# Patient Record
Sex: MALE | Race: WHITE | NOT HISPANIC OR LATINO | Employment: FULL TIME | URBAN - NONMETROPOLITAN AREA
[De-identification: names, ages, dates, MRNs, and addresses within clinical notes are randomized per-mention and may not be internally consistent; named-entity substitution may affect disease eponyms.]

---

## 2017-01-19 ENCOUNTER — HOSPITAL ENCOUNTER (EMERGENCY)
Facility: HOSPITAL | Age: 57
Discharge: HOME | End: 2017-01-19
Attending: EMERGENCY MEDICINE | Admitting: EMERGENCY MEDICINE

## 2017-01-19 ENCOUNTER — APPOINTMENT (OUTPATIENT)
Dept: CT IMAGING | Facility: HOSPITAL | Age: 57
End: 2017-01-19

## 2017-01-19 ENCOUNTER — APPOINTMENT (OUTPATIENT)
Dept: MRI IMAGING | Facility: HOSPITAL | Age: 57
End: 2017-01-19

## 2017-01-19 ENCOUNTER — TRANSCRIBE ORDERS (OUTPATIENT)
Dept: ADMINISTRATIVE | Facility: HOSPITAL | Age: 57
End: 2017-01-19

## 2017-01-19 ENCOUNTER — APPOINTMENT (OUTPATIENT)
Dept: GENERAL RADIOLOGY | Facility: HOSPITAL | Age: 57
End: 2017-01-19

## 2017-01-19 ENCOUNTER — APPOINTMENT (OUTPATIENT)
Dept: ULTRASOUND IMAGING | Facility: HOSPITAL | Age: 57
End: 2017-01-19

## 2017-01-19 VITALS
HEIGHT: 72 IN | RESPIRATION RATE: 16 BRPM | BODY MASS INDEX: 34.54 KG/M2 | HEART RATE: 83 BPM | SYSTOLIC BLOOD PRESSURE: 167 MMHG | TEMPERATURE: 98.3 F | WEIGHT: 255 LBS | DIASTOLIC BLOOD PRESSURE: 76 MMHG | OXYGEN SATURATION: 96 %

## 2017-01-19 DIAGNOSIS — D75.839 THROMBOCYTOSIS: Primary | ICD-10-CM

## 2017-01-19 DIAGNOSIS — R20.0 NUMBNESS: ICD-10-CM

## 2017-01-19 DIAGNOSIS — E23.7 PITUITARY ABNORMALITY (HCC): Primary | ICD-10-CM

## 2017-01-19 DIAGNOSIS — E23.7 PITUITARY ABNORMALITY (HCC): ICD-10-CM

## 2017-01-19 LAB
ALBUMIN SERPL-MCNC: 4.5 G/DL (ref 3.5–5)
ALBUMIN/GLOB SERPL: 1.6 G/DL (ref 1.1–2.5)
ALP SERPL-CCNC: 93 U/L (ref 24–120)
ALT SERPL W P-5'-P-CCNC: 71 U/L (ref 0–54)
AMYLASE SERPL-CCNC: 60 U/L (ref 30–110)
ANION GAP SERPL CALCULATED.3IONS-SCNC: 14 MMOL/L (ref 4–13)
APTT PPP: 28.5 SECONDS (ref 24.1–34.8)
AST SERPL-CCNC: 66 U/L (ref 7–45)
BILIRUB SERPL-MCNC: 0.9 MG/DL (ref 0.1–1)
BILIRUB UR QL STRIP: NEGATIVE
BUN BLD-MCNC: 13 MG/DL (ref 5–21)
BUN/CREAT SERPL: 13.7 (ref 7–25)
CALCIUM SPEC-SCNC: 9.2 MG/DL (ref 8.4–10.4)
CHLORIDE SERPL-SCNC: 101 MMOL/L (ref 98–110)
CLARITY UR: CLEAR
CO2 SERPL-SCNC: 24 MMOL/L (ref 24–31)
COLOR UR: YELLOW
CREAT BLD-MCNC: 0.95 MG/DL (ref 0.5–1.4)
DEPRECATED RDW RBC AUTO: 47.4 FL (ref 40–54)
EOSINOPHIL # BLD MANUAL: 0.1 10*3/MM3 (ref 0–0.7)
EOSINOPHIL NFR BLD MANUAL: 1 % (ref 0–4)
ERYTHROCYTE [DISTWIDTH] IN BLOOD BY AUTOMATED COUNT: 15.2 % (ref 12–15)
GFR SERPL CREATININE-BSD FRML MDRD: 83 ML/MIN/1.73
GLOBULIN UR ELPH-MCNC: 2.9 GM/DL
GLUCOSE BLD-MCNC: 127 MG/DL (ref 70–100)
GLUCOSE UR STRIP-MCNC: NEGATIVE MG/DL
HCT VFR BLD AUTO: 39.2 % (ref 40–52)
HGB BLD-MCNC: 13.7 G/DL (ref 14–18)
HGB UR QL STRIP.AUTO: NEGATIVE
HOLD SPECIMEN: NORMAL
HOLD SPECIMEN: NORMAL
INR PPP: 0.93 (ref 0.91–1.09)
KETONES UR QL STRIP: NEGATIVE
LEUKOCYTE ESTERASE UR QL STRIP.AUTO: NEGATIVE
LIPASE SERPL-CCNC: 206 U/L (ref 23–203)
LYMPHOCYTES # BLD MANUAL: 2.09 10*3/MM3 (ref 0.72–4.86)
LYMPHOCYTES NFR BLD MANUAL: 22 % (ref 15–45)
LYMPHOCYTES NFR BLD MANUAL: 3 % (ref 4–12)
MCH RBC QN AUTO: 30.4 PG (ref 28–32)
MCHC RBC AUTO-ENTMCNC: 34.9 G/DL (ref 33–36)
MCV RBC AUTO: 86.9 FL (ref 82–95)
METAMYELOCYTES NFR BLD MANUAL: 1 % (ref 0–0)
MONOCYTES # BLD AUTO: 0.29 10*3/MM3 (ref 0.19–1.3)
NEUTROPHILS # BLD AUTO: 6.75 10*3/MM3 (ref 1.87–8.4)
NEUTROPHILS NFR BLD MANUAL: 69 % (ref 39–78)
NEUTS BAND NFR BLD MANUAL: 2 % (ref 0–10)
NITRITE UR QL STRIP: NEGATIVE
NT-PROBNP SERPL-MCNC: 77 PG/ML (ref 0–900)
PH UR STRIP.AUTO: 6.5 [PH] (ref 5–8)
PLATELET # BLD AUTO: 998 10*3/MM3 (ref 130–400)
PMV BLD AUTO: 9.5 FL (ref 6–12)
POTASSIUM BLD-SCNC: 4 MMOL/L (ref 3.5–5.3)
PROT SERPL-MCNC: 7.4 G/DL (ref 6.3–8.7)
PROT UR QL STRIP: NEGATIVE
PROTHROMBIN TIME: 12.7 SECONDS (ref 11.9–14.6)
RBC # BLD AUTO: 4.51 10*6/MM3 (ref 4.8–5.9)
RBC MORPH BLD: NORMAL
SMALL PLATELETS BLD QL SMEAR: ABNORMAL
SODIUM BLD-SCNC: 139 MMOL/L (ref 135–145)
SP GR UR STRIP: 1.01 (ref 1–1.03)
TROPONIN I SERPL-MCNC: 0 NG/ML (ref 0–0.07)
UROBILINOGEN UR QL STRIP: NORMAL
VARIANT LYMPHS NFR BLD MANUAL: 2 % (ref 0–5)
WBC MORPH BLD: NORMAL
WBC NRBC COR # BLD: 9.5 10*3/MM3 (ref 4.8–10.8)
WHOLE BLOOD HOLD SPECIMEN: NORMAL
WHOLE BLOOD HOLD SPECIMEN: NORMAL

## 2017-01-19 PROCEDURE — 71010 HC CHEST PA OR AP: CPT

## 2017-01-19 PROCEDURE — 83880 ASSAY OF NATRIURETIC PEPTIDE: CPT | Performed by: EMERGENCY MEDICINE

## 2017-01-19 PROCEDURE — 70551 MRI BRAIN STEM W/O DYE: CPT

## 2017-01-19 PROCEDURE — 84484 ASSAY OF TROPONIN QUANT: CPT

## 2017-01-19 PROCEDURE — 85025 COMPLETE CBC W/AUTO DIFF WBC: CPT | Performed by: EMERGENCY MEDICINE

## 2017-01-19 PROCEDURE — 85007 BL SMEAR W/DIFF WBC COUNT: CPT | Performed by: EMERGENCY MEDICINE

## 2017-01-19 PROCEDURE — 70450 CT HEAD/BRAIN W/O DYE: CPT

## 2017-01-19 PROCEDURE — 76705 ECHO EXAM OF ABDOMEN: CPT

## 2017-01-19 PROCEDURE — 85610 PROTHROMBIN TIME: CPT | Performed by: EMERGENCY MEDICINE

## 2017-01-19 PROCEDURE — 93010 ELECTROCARDIOGRAM REPORT: CPT | Performed by: INTERNAL MEDICINE

## 2017-01-19 PROCEDURE — 93005 ELECTROCARDIOGRAM TRACING: CPT | Performed by: EMERGENCY MEDICINE

## 2017-01-19 PROCEDURE — 82150 ASSAY OF AMYLASE: CPT | Performed by: EMERGENCY MEDICINE

## 2017-01-19 PROCEDURE — 80053 COMPREHEN METABOLIC PANEL: CPT | Performed by: EMERGENCY MEDICINE

## 2017-01-19 PROCEDURE — 85730 THROMBOPLASTIN TIME PARTIAL: CPT | Performed by: EMERGENCY MEDICINE

## 2017-01-19 PROCEDURE — 83690 ASSAY OF LIPASE: CPT | Performed by: EMERGENCY MEDICINE

## 2017-01-19 PROCEDURE — 99284 EMERGENCY DEPT VISIT MOD MDM: CPT

## 2017-01-19 PROCEDURE — 81003 URINALYSIS AUTO W/O SCOPE: CPT | Performed by: EMERGENCY MEDICINE

## 2017-01-19 RX ORDER — SODIUM CHLORIDE 0.9 % (FLUSH) 0.9 %
10 SYRINGE (ML) INJECTION AS NEEDED
Status: DISCONTINUED | OUTPATIENT
Start: 2017-01-19 | End: 2017-01-19 | Stop reason: HOSPADM

## 2017-01-19 NOTE — ED PROVIDER NOTES
Subjective upper body numbness  History of Present Illness  Mr. Rodriguez is a 56-year-old white man who comes in today with chief complaint of feeling funny.  He tells me he was recently [facility] and was noted to have thrombocytosis.  He tells me that he was subsequently sent for further labs but he does not know the results of those labs.  He presents today with complaints of upper body and upper extremity numbness.  He tells me his lower body has been fine and he has had no difficulty with ambulation.  He has never had symptoms like this before.  His wife states she noticed that there is simply something that is not right in she thinks that maybe there is some element of confusion.  He has had no fevers or chills no diffuse arthralgias or myalgias.  He has no history of CVA no history of TIAs.  He also denies any coronary artery disease history.  He does have a history of hypertension that he states is borderline and he is currently on no medications.  Review of Systems   Constitutional: Negative.    HENT: Negative.    Eyes: Negative.    Respiratory: Negative.    Cardiovascular: Negative.    Gastrointestinal: Negative.    Endocrine: Negative.    Genitourinary: Negative.    Musculoskeletal: Negative.    Skin: Negative.    Allergic/Immunologic: Negative.    Neurological: Positive for numbness.        See note   Hematological: Negative.    Psychiatric/Behavioral: Negative.    All other systems reviewed and are negative.      Past Medical History   Diagnosis Date   • Gout    • Hypertension        No Known Allergies    Past Surgical History   Procedure Laterality Date   • Cyst removal       left neck       Family History   Problem Relation Age of Onset   • Diabetes Father    • Cancer Paternal Aunt    • Cancer Maternal Grandmother      Tumor Pituatary gland       Social History     Social History   • Marital status:      Spouse name: N/A   • Number of children: N/A   • Years of education: N/A  "    Social History Main Topics   • Smoking status: Never Smoker   • Smokeless tobacco: None   • Alcohol use Yes   • Drug use: No   • Sexual activity: Yes     Partners: Female     Other Topics Concern   • None     Social History Narrative       Prior to Admission medications    Not on File       Medications - No data to display    Objective   Physical Exam   Constitutional: He is oriented to person, place, and time. He appears well-developed and well-nourished.   HENT:   Head: Normocephalic and atraumatic.   Right Ear: External ear normal.   Left Ear: External ear normal.   Nose: Nose normal.   Mouth/Throat: Oropharynx is clear and moist.   Eyes: Conjunctivae and EOM are normal. Pupils are equal, round, and reactive to light. Right eye exhibits no discharge. Left eye exhibits no discharge.   Neck: Normal range of motion. Neck supple. No thyromegaly present.   Cardiovascular: Normal rate, regular rhythm, normal heart sounds and intact distal pulses.  Exam reveals no friction rub.    No murmur heard.  Pulmonary/Chest: Effort normal and breath sounds normal. No respiratory distress.   Abdominal: Soft. Bowel sounds are normal. He exhibits no distension. There is no tenderness.   Musculoskeletal: Normal range of motion. He exhibits no edema or deformity.   Neurological: He is alert and oriented to person, place, and time. He has normal reflexes. No cranial nerve deficit.   Skin: Skin is warm and dry. No rash noted.   Psychiatric: Judgment normal.   Nursing note and vitals reviewed.      Procedures         Visit Vitals   • /76   • Pulse 83   • Temp 98.3 °F (36.8 °C) (Oral)   • Resp 16   • Ht 72\" (182.9 cm)   • Wt 255 lb (116 kg)   • SpO2 96%   • BMI 34.58 kg/m2       Lab Results (last 24 hours)     ** No results found for the last 24 hours. **          MRI Brain Without Contrast   Final Result   1. There is no evidence of acute infarct on the diffusion sequence.   2. Scattered areas of T2 high signal within the " periventricular white   matter are nonspecific in a patient of this age. There are most likely   due to chronic small vessel disease. Demyelinating disease is not   excluded.   3. The pituitary gland is prominent for age. It measures about 1.1 cm in   cephalocaudal dimension and has an upward in the convex margin.   Pituitary protocol MRI is suggested to rule out a tumor.   4. Mild dilatation of the lateral and third ventricles. Minimal atrophy.       The full report of this exam was immediately signed and available to the   emergency room. The patient is currently in the emergency room.       Electronically Signed By-Dr. Hamilton Talbot MD On:1/19/2017 3:23 PM EST   This report was finalized on 01/19/2017 14:23 by Dr. Hamilton Talbot MD.      US Spleen   Final Result   Borderline splenic enlargement, as described.       Electronically Signed By-Dr. Hamilton Talbot MD On:1/19/2017 2:16 PM EST   This report was finalized on 01/19/2017 13:16 by Dr. Hamilton Talbot MD.      XR Chest 1 View   Final Result   Portable chest x-ray demonstrates no active disease.       Electronically Signed By-Dr. Hamilton Talbot MD On:1/19/2017 10:48 AM EST   This report was finalized on 01/19/2017 09:48 by Dr. Hamilton Talbot MD.      CT Head Without Contrast   Final Result   1. No hemorrhage, edema or mass effect.   2. Mild dilatation of the lateral and third ventricles that may be   developmental. No significant atrophy is seen. This could represent   mild/early hydrocephalus.       The full report of this exam was immediately signed and available to the   emergency room. The patient is currently in the emergency room.               Electronically Signed By-Dr. Hamilton Talbot MD On:1/19/2017 11:03 AM EST   This report was finalized on 01/19/2017 10:03 by Dr. Hamilton Talbot MD.              ED Course  ED Course   Comment By Time   Of the findings have been discussed with the patient.  His platelet count is 998 thousand.  I did speak with the  hematologist and he recommended that the patient get an ultrasound of the spleen.  Is borderline splenic enlargement.  Additionally he is to start a baby aspirin per day.  The patient will follow-up with Dr. Dai on Monday the appointment has been scheduled.  Additionally he had an abnormality noted on his MR of the brain.  It was recommended by the radiologist that the MRI of the pituitary be obtained.  I did discuss with the radiologist and it can be done as an outpatient.  He has thereby been scheduled for Monday at 6:30 PM here and outpatient.  The pt has been told about all these findings and also the need to start a baby aspirin. Delia Ram MD 01/19 7216          MDM  Number of Diagnoses or Management Options  Numbness: new and requires workup  Pituitary abnormality: new and requires workup  Thrombocytosis: new and requires workup     Amount and/or Complexity of Data Reviewed  Clinical lab tests: ordered and reviewed  Tests in the radiology section of CPT®: ordered and reviewed  Obtain history from someone other than the patient: yes  Discuss the patient with other providers: yes    Risk of Complications, Morbidity, and/or Mortality  Presenting problems: high  Diagnostic procedures: high  Management options: high    Patient Progress  Patient progress: stable      Differential diagnosis included but was not limited to numbness:hypocalcemia, TIA, CVA, DM, nerve root compression, . All labs and imaging results were reviewed by Delia Ram MD    Final diagnoses:   Thrombocytosis   Numbness   Pituitary abnormality        Delia Ram MD  01/30/17 [id number]

## 2017-01-19 NOTE — DISCHARGE INSTRUCTIONS
You will need to follow with Dr. Dai on Monday as outlined for your thrombocytosis.  He does want you to begin a baby aspirin per day.  Additionally your MRI of the pituitary has been scheduled for Monday at 6:30 PM and outpatient.  If you experience any further symptoms or you are unable to follow-up with your own primary care physician within next 24 hours please feel free to return to the emergency room immediately for reevaluation.

## 2017-01-23 ENCOUNTER — HOSPITAL ENCOUNTER (OUTPATIENT)
Dept: MRI IMAGING | Facility: HOSPITAL | Age: 57
Discharge: HOME | End: 2017-01-23
Attending: EMERGENCY MEDICINE | Admitting: EMERGENCY MEDICINE

## 2017-01-23 DIAGNOSIS — E23.7 PITUITARY ABNORMALITY (HCC): ICD-10-CM

## 2017-01-23 PROCEDURE — A9577 INJ MULTIHANCE: HCPCS | Performed by: EMERGENCY MEDICINE

## 2017-01-23 PROCEDURE — 70553 MRI BRAIN STEM W/O & W/DYE: CPT

## 2017-01-23 PROCEDURE — 0 GADOBENATE DIMEGLUMINE 529 MG/ML SOLUTION: Performed by: EMERGENCY MEDICINE

## 2017-01-23 RX ADMIN — GADOBENATE DIMEGLUMINE 20 ML: 529 INJECTION, SOLUTION INTRAVENOUS at 07:15

## 2017-01-25 ENCOUNTER — CONSULT (OUTPATIENT)
Dept: ONCOLOGY | Facility: CLINIC | Age: 57
End: 2017-01-25

## 2017-01-25 VITALS
HEART RATE: 88 BPM | TEMPERATURE: 98.2 F | SYSTOLIC BLOOD PRESSURE: 166 MMHG | HEIGHT: 72 IN | DIASTOLIC BLOOD PRESSURE: 102 MMHG | OXYGEN SATURATION: 98 % | WEIGHT: 256.1 LBS | RESPIRATION RATE: 16 BRPM | BODY MASS INDEX: 34.69 KG/M2

## 2017-01-25 DIAGNOSIS — K76.0 FATTY LIVER: Primary | ICD-10-CM

## 2017-01-25 DIAGNOSIS — D75.839 THROMBOCYTHEMIA: Primary | ICD-10-CM

## 2017-01-25 DIAGNOSIS — D75.839 THROMBOCYTOSIS: Primary | ICD-10-CM

## 2017-01-25 LAB
AUTO MIXED CELLS #: 0.9 10*3/UL (ref 0.1–1.5)
AUTO MIXED CELLS %: 8.7 % (ref 0.2–15.1)
CYTOLOGIST CVX/VAG CYTO: NORMAL
ERYTHROCYTE [DISTWIDTH] IN BLOOD BY AUTOMATED COUNT: 16.3 % (ref 11.5–14.5)
HCT VFR BLD AUTO: 45.4 % (ref 42–52)
HGB BLD-MCNC: 14.3 G/DL (ref 14–18)
LYMPHOCYTES # BLD AUTO: 3.1 10*3/MM3 (ref 0.8–7)
LYMPHOCYTES NFR BLD AUTO: 31.5 % (ref 10–58.5)
MCH RBC QN AUTO: 29.6 PG (ref 27–31)
MCHC RBC AUTO-ENTMCNC: 31.5 G/DL (ref 33–37)
MCV RBC AUTO: 93.9 FL (ref 80–94)
NEUTROPHILS # BLD AUTO: 6 10*3/MM3 (ref 2–7.8)
NEUTROPHILS NFR BLD AUTO: 59.8 % (ref 37–92)
PATH INTERP BLD-IMP: NORMAL
PLATELET # BLD AUTO: 901 10*3/MM3 (ref 130–400)
PMV BLD AUTO: 8.1 FL (ref 6–12)
RBC # BLD AUTO: 4.83 10*6/MM3 (ref 4.7–6.1)
WBC NRBC COR # BLD: 10 10*3/MM3 (ref 4.8–10.8)

## 2017-01-25 PROCEDURE — 85025 COMPLETE CBC W/AUTO DIFF WBC: CPT | Performed by: INTERNAL MEDICINE

## 2017-01-25 PROCEDURE — 99214 OFFICE O/P EST MOD 30 MIN: CPT | Performed by: INTERNAL MEDICINE

## 2017-01-25 PROCEDURE — 36415 COLL VENOUS BLD VENIPUNCTURE: CPT | Performed by: INTERNAL MEDICINE

## 2017-01-25 RX ORDER — ASPIRIN 81 MG/1
81 TABLET, CHEWABLE ORAL DAILY
COMMUNITY

## 2017-01-25 NOTE — MR AVS SNAPSHOT
Devonte Lara   2017 3:20 PM   Consult    Dept Phone:  [phone]   Encounter #:  [id number]    Provider:  Jonah Dai MD   Department:  [facility] HEMATOLOGY & ONCOLOGY                Your Full Care Plan              Your Updated Medication List          This list is accurate as of: 17 12:31 PM.  Always use your most recent med list.                aspirin 81 MG chewable tablet               You Were Diagnosed With        Codes Comments    Thrombocythemia    -  Primary ICD-10-CM: D47.3  ICD-9-CM: 238.71       Instructions     None    Patient Instructions History      Upcoming Appointments     Visit Type Date Time Department    NEW PT - HEMATOLOGY 2017  3:20 PM [facility]    LAB 2017  3:00 PM [facility]    CT PAD ABDOMEN W CONTRAST 2017  8:45 AM [facility]    FOLLOW UP 1 UNIT 2/3/2017 10:30 AM [facility]    LAB 2/3/2017 10:15 AM [facility]      MyChart Signup     [employer] allows you to send messages to your doctor, view your test results, renew your prescriptions, schedule appointments, and more. To sign up, go to Poll Everywhere and click on the Sign Up Now link in the New User? box. Enter your [employer] Activation Code exactly as it appears below along with the last four digits of your Social Security Number and your Date of Birth () to complete the sign-up process. If you do not sign up before the expiration date, you must request a new code.    [employer] Activation Code: [email or web address]  Expires: 2017  3:52 PM    If you have questions, you can email [email or web address] or call [phone] to talk to our [employer] staff. Remember, [employer] is NOT to be used for urgent needs. For medical emergencies, dial 911.               Other Info from Your Visit           Your Appointments     2017  3:00 PM CST   LAB with [facility]   [facility] HEMATOLOGY &  "ONCOLOGY ([place])    [address] [address]   [phone]            Jan 25, 2017  3:20 PM CST   NEW HEMATOLOGY with Jonah Dai MD   [facility] HEMATOLOGY & ONCOLOGY ([place])    [address] [address]   [phone]            Jan 30, 2017  8:45 AM CST   CT PAD ABDOMEN W CONTRAST with PAD BIC CT 1   [facility] CT BIC ([place])    [address] [place] [id number] [phone]           BRING LIST OF MEDS ARRIVE 30 PRIOR TO PROCEDURE IF ORAL PREP IS NEEDED MUST BE PICKED UP AT LEAST DAY BEFORE TO DRINK 2 HRS BEFORE TEST NPO AFTER MIDNIGHT, OR 6 HRS PRIOR IF TEST FOR ABD AND OR PELVIS            Feb 03, 2017 10:15 AM CST   LAB with [facility] LAB   [facility] HEMATOLOGY & ONCOLOGY ([place])    [address] [address]   [phone]            Feb 03, 2017 10:30 AM CST   FOLLOW UP with Jonah Dai MD   [facility] HEMATOLOGY & ONCOLOGY ([place])    [address] [address]   [phone]              Allergies     No Known Allergies      Vital Signs     Blood Pressure Pulse Temperature Respirations Height Weight    166/102 88 98.2 °F (36.8 °C) (Tympanic) 16 72\" (182.9 cm) 256 lb 1.6 oz (116 kg)    Oxygen Saturation Body Mass Index Smoking Status             98% 34.73 kg/m2 Never Smoker         Problems and Diagnoses Noted     High platelet count        "

## 2017-01-25 NOTE — PROGRESS NOTES
[facility]  HEMATOLOGY & ONCOLOGY        Subjective     VISIT DIAGNOSIS: Thrombocytosis etiology unclear  Splenomegaly with elevated liver function enzymes, etiology unclear  Encounter Diagnosis   Name Primary?   • Thrombocythemia Yes       REASON FOR VISIT:   No chief complaint on file.       HEMATOLOGY / ONCOLOGY HISTORY: This is a 56-year-old white male in the usual state of health about a week ago he felt dizzy and lightheaded and getting and presented himself to the emergency room.  CT scans and MRIs of the head were without any abnormality.  Notable was elevated platelets at 998 as well as elevation of AST and ALT and an ultrasound of the spleen showed a spleen size of 13.5 cm with a normal size is 9 x 10 x 11.  Next    Patient is being referred to hematology for further recommendations, after he was placed on aspirin 81 mg the margins from.   No history exists.     [No treatment plan]  Cancer Staging Information:  No matching staging information was found for the patient.      INTERVAL HISTORY  Patient ID: Devonte Lara is a 56 y.o. year old male This is a 56-year-old white male in the usual state of health about a week ago he felt dizzy and lightheaded and getting and presented himself to the emergency room.  CT scans and MRIs of the head were without any abnormality.  Notable was elevated platelets at 998 as well as elevation of AST and ALT and an ultrasound of the spleen showed a spleen size of 13.5 cm with a normal size is 9 x 10 x 11.         Review of Systems         Medications:    Current Outpatient Prescriptions   Medication Sig Dispense Refill   • aspirin 81 MG chewable tablet Chew 81 mg Daily.       No current facility-administered medications for this visit.        ALLERGIES:  No Known Allergies    Objective      @VITALS    No flowsheet data found.    General Appearance: Patient is awake, alert, oriented and in no acute distress. Patient is welldeveloped, wellnourished, and  appears stated age.  HEENT: Normocephalic. Sclerae clear, conjunctiva pink, extraocular movements intact, pupils, round, reactive to light and  accommodation. Mouth and throat are clear with moist oral mucosa.  NECK: Supple, no jugular venous distention, thyroid not enlarged.  LYMPH: No cervical, supraclavicular, axillary, or inguinal lymphadenopathy.  CHEST: Equal bilateral expansion, AP  diameter normal, resonant percussion note  LUNGS: Good air movement, no rales, rhonchi, rubs or wheezes with auscultation  CARDIO: Regular sinus rhythm, no murmurs, gallops or rubs.  ABDOMEN: Nondistended, soft, No tenderness, no guarding, no rebound, No hepatosplenomegaly. No abdominal masses. Bowel sounds positive. No hernia  GENITALIA: Not examined.  BREASTS: Not examined.  MUSKEL: No joint swelling, decreased motion, or inflammation  EXTREMS: No edema, clubbing, cyanosis, No varicose veins.  NEURO: Grossly nonfocal, Gait is coordinated and smooth, Cognition is preserved.  SKIN: No rashes, no ecchymoses, no petechia.  PSYCH: Oriented to time, place and person. Memory is preserved. Mood and affect appear normal      RECENT LABS:  Admission on 01/19/2017, Discharged on 01/19/2017   Component Date Value Ref Range Status   • Extra Tube 01/19/2017 hold for add-on   Final    Auto resulted   • Extra Tube 01/19/2017 Hold for add-ons.   Final    Auto resulted.   • Extra Tube 01/19/2017 hold for add-on   Final    Auto resulted   • Extra Tube 01/19/2017 Hold for add-ons.   Final    Auto resulted.   • Glucose 01/19/2017 127* 70 - 100 mg/dL Final   • BUN 01/19/2017 13  5 - 21 mg/dL Final   • Creatinine 01/19/2017 0.95  0.50 - 1.40 mg/dL Final   • Sodium 01/19/2017 139  135 - 145 mmol/L Final   • Potassium 01/19/2017 4.0  3.5 - 5.3 mmol/L Final   • Chloride 01/19/2017 101  98 - 110 mmol/L Final   • CO2 01/19/2017 24.0  24.0 - 31.0 mmol/L Final   • Calcium 01/19/2017 9.2  8.4 - 10.4 mg/dL Final   • Total Protein 01/19/2017 7.4  6.3 - 8.7 g/dL  Final   • Albumin 01/19/2017 4.50  3.50 - 5.00 g/dL Final   • ALT (SGPT) 01/19/2017 71* 0 - 54 U/L Final   • AST (SGOT) 01/19/2017 66* 7 - 45 U/L Final   • Alkaline Phosphatase 01/19/2017 93  24 - 120 U/L Final   • Total Bilirubin 01/19/2017 0.9  0.1 - 1.0 mg/dL Final   • eGFR Non African Amer 01/19/2017 83  >60 mL/min/1.73 Final   • Globulin 01/19/2017 2.9  gm/dL Final   • A/G Ratio 01/19/2017 1.6  1.1 - 2.5 g/dL Final   • BUN/Creatinine Ratio 01/19/2017 13.7  7.0 - 25.0 Final   • Anion Gap 01/19/2017 14.0* 4.0 - 13.0 mmol/L Final   • Protime 01/19/2017 12.7  11.9 - 14.6 Seconds Final   • INR 01/19/2017 0.93  0.91 - 1.09 Final   • PTT 01/19/2017 28.5  24.1 - 34.8 seconds Final   • proBNP 01/19/2017 77.0  0.0 - 900.0 pg/mL Final   • Color, UA 01/19/2017 Yellow  Yellow, Straw Final   • Appearance, UA 01/19/2017 Clear  Clear Final   • pH, UA 01/19/2017 6.5  5.0 - 8.0 Final   • Specific Gravity, UA 01/19/2017 1.011  1.005 - 1.030 Final   • Glucose, UA 01/19/2017 Negative  Negative Final   • Ketones, UA 01/19/2017 Negative  Negative Final   • Bilirubin, UA 01/19/2017 Negative  Negative Final   • Blood, UA 01/19/2017 Negative  Negative Final   • Protein, UA 01/19/2017 Negative  Negative Final   • Leuk Esterase, UA 01/19/2017 Negative  Negative Final   • Nitrite, UA 01/19/2017 Negative  Negative Final   • Urobilinogen, UA 01/19/2017 0.2 E.U./dL  0.2 - 1.0 E.U./dL Final   • Amylase 01/19/2017 60  30 - 110 U/L Final   • Lipase 01/19/2017 206* 23 - 203 U/L Final   • WBC 01/19/2017 9.50  4.80 - 10.80 10*3/mm3 Final   • RBC 01/19/2017 4.51* 4.80 - 5.90 10*6/mm3 Final   • Hemoglobin 01/19/2017 13.7* 14.0 - 18.0 g/dL Final   • Hematocrit 01/19/2017 39.2* 40.0 - 52.0 % Final   • MCV 01/19/2017 86.9  82.0 - 95.0 fL Final   • MCH 01/19/2017 30.4  28.0 - 32.0 pg Final   • MCHC 01/19/2017 34.9  33.0 - 36.0 g/dL Final   • RDW 01/19/2017 15.2* 12.0 - 15.0 % Final   • RDW-SD 01/19/2017 47.4  40.0 - 54.0 fl Final   • MPV 01/19/2017 9.5   6.0 - 12.0 fL Final   • Platelets 01/19/2017 998* 130 - 400 10*3/mm3 Final   • Troponin I 01/19/2017 0.00  0.00 - 0.07 ng/mL Final    Serial Number: [id number]    : [id number]   • Neutrophil % 01/19/2017 69.0  39.0 - 78.0 % Final   • Lymphocyte % 01/19/2017 22.0  15.0 - 45.0 % Final   • Monocyte % 01/19/2017 3.0* 4.0 - 12.0 % Final   • Eosinophil % 01/19/2017 1.0  0.0 - 4.0 % Final   • Bands %  01/19/2017 2.0  0.0 - 10.0 % Final   • Metamyelocyte % 01/19/2017 1.0* 0.0 - 0.0 % Final   • Atypical Lymphocyte % 01/19/2017 2.0  0.0 - 5.0 % Final   • Neutrophils Absolute 01/19/2017 6.75  1.87 - 8.40 10*3/mm3 Final   • Lymphocytes Absolute 01/19/2017 2.09  0.72 - 4.86 10*3/mm3 Final   • Monocytes Absolute 01/19/2017 0.29  0.19 - 1.30 10*3/mm3 Final   • Eosinophils Absolute 01/19/2017 0.10  0.00 - 0.70 10*3/mm3 Final   • RBC Morphology 01/19/2017 Normal  Normal Final   • WBC Morphology 01/19/2017 Normal  Normal Final   • Platelet Estimate 01/19/2017 Increased  Normal Final       RADIOLOGY:  Ct Head Without Contrast    Result Date: 1/19/2017  Narrative: EXAMINATION:  CT HEAD WO CONTRAST-  1/19/2017 9:42 AM CST  HISTORY: Upper body numbness.  TECHNIQUE: Multiple axial images were obtained through the brain without contrast infusion. Multiplanar images were reconstructed.  DLP: 820 mGy-cm. Automated dosage control was utilized.  COMPARISON: No comparison study.  FINDINGS: There are no hemorrhage, edema or mass effect. There is mild dilatation of the lateral and third ventricles. This may be developmental. There is no significant atrophy. The visualized paranasal sinuses and mastoid air cells are clear.      Impression: 1. No hemorrhage, edema or mass effect. 2. Mild dilatation of the lateral and third ventricles that may be developmental. No significant atrophy is seen. This could represent mild/early hydrocephalus.  The full report of this exam was immediately signed and available to the emergency room. The patient is  currently in the emergency room.    Electronically Signed By-Dr. Hamilton Talbot MD On:1/19/2017 11:03 AM EST This report was finalized on 01/19/2017 10:03 by Dr. Hamilton Talbot MD.    Mri Brain Without Contrast    Result Date: 1/19/2017  Narrative: EXAMINATION:  MRI BRAIN WO CONTRAST-  1/19/2017 1:30 PM CST  HISTORY: Mental status changes. Numbness.  TECHNIQUE: Multiplanar imaging was performed in a high field magnet.  COMPARISON: No comparison study.  FINDINGS: There is no evidence of acute infarct on the diffusion-weighted sequence. There are scattered areas of T2 high signal within the periventricular white matter that are nonspecific in a patient of this age. There are no brainstem or posterior fossa areas of signal abnormality. There is minimal atrophy. Mild dilatation of the lateral and third ventricles. On the sagittal images, there is a prominent pituitary gland within of early convex margin.      Impression: 1. There is no evidence of acute infarct on the diffusion sequence. 2. Scattered areas of T2 high signal within the periventricular white matter are nonspecific in a patient of this age. There are most likely due to chronic small vessel disease. Demyelinating disease is not excluded. 3. The pituitary gland is prominent for age. It measures about 1.1 cm in cephalocaudal dimension and has an upward in the convex margin. Pituitary protocol MRI is suggested to rule out a tumor. 4. Mild dilatation of the lateral and third ventricles. Minimal atrophy.  The full report of this exam was immediately signed and available to the emergency room. The patient is currently in the emergency room.  Electronically Signed By-Dr. Hamilton Talbot MD On:1/19/2017 3:23 PM EST This report was finalized on 01/19/2017 14:23 by Dr. Hamilton Talbot MD.    Us Spleen    Result Date: 1/19/2017  Narrative: EXAMINATION:  US SPLEEN-  1/19/2017 12:34 PM CST  HISTORY: Thrombocytosis. Rule out splenomegaly.  COMPARISON: No comparison study.   TECHNIQUE: Multiple sonographic images were obtained.  FINDINGS: The spleen is borderline enlarged measuring 13.2 cm in length and about 5 cm in short axis diameter.      Impression: Borderline splenic enlargement, as described.  Electronically Signed By-Dr. Hamilton Talbot MD On:1/19/2017 2:16 PM EST This report was finalized on 01/19/2017 13:16 by Dr. Hamilton Talbot MD.    Mri Pituitary With & Without Contrast    Result Date: 1/23/2017  Narrative: EXAMINATION: MR OF THE SELLA WITH AND WITHOUT CONTRAST 01/23/2017  COMPARISON:  MRI of the brain dated 01/19/2017  HISTORY:   Male, 56 years-old. Pituitary mass.  TECHNIQUE: Routine pulse sequences were obtained of the brain and sella before and after the administration of IV contrast.  Dynamic postcontrast coronal images through the sella were obtained.  FINDINGS: The pituitary gland is enlarged and irregular in contour. Specifically, it measures 1.1 x 0.9 x 1 cm and shows homogeneous enhancement. No differential enhancement is identified on dynamic sequences within the gland to locate a microadenoma. There is mild suprasellar extension without convincing evidence of mass effect on the prechiasmatic optic nerves, chiasmatic or post chiasmatic optic tracts. There is no evidence of cavernous sinus invasion. There is minimal involvement of the infundibulum.  There is smooth plate-like thickening and enhancement along the sella. This is felt to reflect reactive changes related to which imaging of the underlying bone.      Impression: Enlarged homogeneously enhancing pituitary gland, without discrete differential enhancement to locate an adenoma. Favor pituitary hyperplasia versus adenoma. Meningioma is felt to be less likely given the involvement of the infundibulum (although minimal). Correlation with endocrine laboratory values recommended. This report was finalized on 01/23/2017 17:37 by Dr. Barbie Kauffman MD.    Xr Chest 1 View    Result Date: 1/19/2017  Narrative:  EXAMINATION:  XR CHEST 1 VW-  1/19/2017 9:43 AM CST  HISTORY: Weakness.  COMPARISON: No comparison study.  FINDINGS:  The lungs are expanded and clear. The heart size is normal. There is no vascular congestion or edema.  There is no significant bony abnormality seen.      Impression: Portable chest x-ray demonstrates no active disease.  Electronically Signed By-Dr. Hamilton Talbot MD On:1/19/2017 10:48 AM EST This report was finalized on 01/19/2017 09:48 by Dr. Hamilton Talbot MD.           Assessment/Plan        1.  Elevated platelets.  His platelet count is close to 1 million, especially on January 19 was 998, today's are pending.  From my experience when the platelet count is close to median this generally is associated with a primary bone marrow abnormality i.e. essential thrombocytosis, for which she may need a bone marrow biopsy to demonstrate presence of Ismael 2 mutation.  However other etiologies of elevated platelet could be Iron and deficiency.  Therefore before start on a bone marrow biopsy I need to check his serum ferritin and iron panel which will be done today.  I will also check his stools for occult blood ×3.  Once I have the levels back at that point I would make a decision about doing a bone marrow biopsy.    2.  Elevated liver function tests and splenomegaly.  Spleen is borderline enlarged at 13.5 cm, there is a normal size is 9 x 10 x 11. This gentleman weighs 256 pounds, ideal body weight should be 160 pounds considering that he is 6 feet tall.  Since he is morbidly obese I suspect that he may have Wallace.  Although he had an ultrasound of the spleen there was no mention of hyperechogenicity or evaluation of the liver.  I will request the radiology services at [facility] to relook at the liver ultrasound's for hyperechogenicity off the liver as well as color-flow Doppler did not look at the direction of flow of the portal system whether it is towards the spleen orally from the spleen.  Many  times fatty liver can cause simmering inflammation called Wallace and therefore flow towards the spleen.  Next    Patient also admits drinking alcohol and it could be alcoholic Toxicity and perhaps early development off cirrhosis.  Rule that out I will order a CT scan of the abdomen with IV contrast since his creatinine is normal.      After the above test results unfold we will make further determinations in respect to performing a bone marrow biopsy.  He will have to hold off his aspirin at least 3-4 days prior to me doing a bone marrow we will make that determination next week.                Jonah Dai MD    1/25/2017    12:11 PM

## 2017-01-26 LAB
FERRITIN SERPL-MCNC: 434 NG/ML (ref 17.9–464)
IRON SATN MFR SERPL: 30 % (ref 20–45)
IRON SERPL-MCNC: 105 MCG/DL (ref 42–180)
TIBC SERPL-MCNC: 354 MCG/DL (ref 225–420)
UIBC SERPL-MCNC: 249 MCG/DL

## 2017-01-30 ENCOUNTER — HOSPITAL ENCOUNTER (OUTPATIENT)
Dept: CT IMAGING | Facility: HOSPITAL | Age: 57
Discharge: HOME | End: 2017-01-30
Attending: INTERNAL MEDICINE | Admitting: INTERNAL MEDICINE

## 2017-01-30 DIAGNOSIS — D75.839 THROMBOCYTHEMIA: ICD-10-CM

## 2017-01-30 LAB — CREAT BLDA-MCNC: 1 MG/DL (ref 0.6–1.3)

## 2017-01-30 PROCEDURE — 0 IOPAMIDOL 61 % SOLUTION: Performed by: INTERNAL MEDICINE

## 2017-01-30 PROCEDURE — 74160 CT ABDOMEN W/CONTRAST: CPT

## 2017-01-30 PROCEDURE — 82565 ASSAY OF CREATININE: CPT

## 2017-01-30 RX ADMIN — IOPAMIDOL 100 ML: 612 INJECTION, SOLUTION INTRAVENOUS at 09:30

## 2017-02-02 DIAGNOSIS — D47.3 THROMBOCYTHEMIA, ESSENTIAL (HCC): Primary | ICD-10-CM

## 2017-02-03 ENCOUNTER — LAB (OUTPATIENT)
Dept: ONCOLOGY | Facility: CLINIC | Age: 57
End: 2017-02-03

## 2017-02-03 ENCOUNTER — OFFICE VISIT (OUTPATIENT)
Dept: ONCOLOGY | Facility: CLINIC | Age: 57
End: 2017-02-03

## 2017-02-03 VITALS
OXYGEN SATURATION: 99 % | BODY MASS INDEX: 33.74 KG/M2 | SYSTOLIC BLOOD PRESSURE: 142 MMHG | HEIGHT: 72 IN | DIASTOLIC BLOOD PRESSURE: 86 MMHG | RESPIRATION RATE: 16 BRPM | WEIGHT: 249.1 LBS | HEART RATE: 88 BPM | TEMPERATURE: 98.2 F

## 2017-02-03 DIAGNOSIS — D75.839 THROMBOCYTHEMIA: ICD-10-CM

## 2017-02-03 DIAGNOSIS — D47.3 THROMBOCYTHEMIA, ESSENTIAL (HCC): ICD-10-CM

## 2017-02-03 DIAGNOSIS — E78.6 HYPOCHOLESTEROLEMIA: Primary | ICD-10-CM

## 2017-02-03 DIAGNOSIS — D75.839 THROMBOCYTHEMIA: Primary | ICD-10-CM

## 2017-02-03 LAB
ALBUMIN SERPL-MCNC: 4.5 G/DL (ref 3.5–5)
ALBUMIN/GLOB SERPL: 1.4 G/DL (ref 1.1–2.5)
ALP SERPL-CCNC: 86 U/L (ref 24–120)
ALT SERPL W P-5'-P-CCNC: 100 U/L (ref 0–54)
ANION GAP SERPL CALCULATED.3IONS-SCNC: 5 MMOL/L (ref 4–13)
AST SERPL-CCNC: 284 U/L (ref 7–45)
AUTO MIXED CELLS #: 0.9 10*3/UL (ref 0.1–1.5)
AUTO MIXED CELLS %: 9.6 % (ref 0.2–15.1)
BILIRUB SERPL-MCNC: 1.2 MG/DL (ref 0.1–1)
BUN BLD-MCNC: 11 MG/DL (ref 5–21)
BUN/CREAT SERPL: 12.1 (ref 7–25)
CALCIUM SPEC-SCNC: 9 MG/DL (ref 8.4–10.4)
CHLORIDE SERPL-SCNC: 100 MMOL/L (ref 98–110)
CO2 SERPL-SCNC: 32 MMOL/L (ref 24–31)
CREAT BLD-MCNC: 0.91 MG/DL (ref 0.5–1.4)
ERYTHROCYTE [DISTWIDTH] IN BLOOD BY AUTOMATED COUNT: 16.7 % (ref 11.5–14.5)
GFR SERPL CREATININE-BSD FRML MDRD: 87 ML/MIN/1.73
GLOBULIN UR ELPH-MCNC: 3.2 GM/DL
GLUCOSE BLD-MCNC: 105 MG/DL (ref 70–100)
HCT VFR BLD AUTO: 42.8 % (ref 42–52)
HGB BLD-MCNC: 13.7 G/DL (ref 14–18)
LYMPHOCYTES # BLD AUTO: 3.5 10*3/MM3 (ref 0.8–7)
LYMPHOCYTES NFR BLD AUTO: 38.9 % (ref 10–58.5)
MCH RBC QN AUTO: 29.8 PG (ref 27–31)
MCHC RBC AUTO-ENTMCNC: 32 G/DL (ref 33–37)
MCV RBC AUTO: 93.1 FL (ref 80–94)
NEUTROPHILS # BLD AUTO: 4.7 10*3/MM3 (ref 2–7.8)
NEUTROPHILS NFR BLD AUTO: 51.5 % (ref 37–92)
PLATELET # BLD AUTO: 0 10*3/MM3 (ref 130–400)
PMV BLD AUTO: 8.2 FL (ref 6–12)
POTASSIUM BLD-SCNC: 3.9 MMOL/L (ref 3.5–5.3)
PROT SERPL-MCNC: 7.7 G/DL (ref 6.3–8.7)
RBC # BLD AUTO: 4.6 10*6/MM3 (ref 4.7–6.1)
SODIUM BLD-SCNC: 137 MMOL/L (ref 135–145)
WBC NRBC COR # BLD: 9.1 10*3/MM3 (ref 4.8–10.8)

## 2017-02-03 PROCEDURE — 38221 DX BONE MARROW BIOPSIES: CPT | Performed by: INTERNAL MEDICINE

## 2017-02-03 PROCEDURE — 99214 OFFICE O/P EST MOD 30 MIN: CPT | Performed by: INTERNAL MEDICINE

## 2017-02-03 PROCEDURE — 80053 COMPREHEN METABOLIC PANEL: CPT | Performed by: INTERNAL MEDICINE

## 2017-02-03 PROCEDURE — 85025 COMPLETE CBC W/AUTO DIFF WBC: CPT | Performed by: INTERNAL MEDICINE

## 2017-02-03 RX ORDER — VALSARTAN 160 MG/1
TABLET ORAL
COMMUNITY
Start: 2017-01-26 | End: 2020-01-06

## 2017-02-03 NOTE — PROGRESS NOTES
[facility]  HEMATOLOGY & ONCOLOGY        Subjective     VISIT DIAGNOSIS: Thrombocytosis etiology unclear  Splenomegaly with elevated liver function enzymes, etiology unclear  No diagnosis found.    REASON FOR VISIT:   No chief complaint on file.       HEMATOLOGY / ONCOLOGY HISTORY: This is a 56-year-old white male in the usual state of health about a week ago he felt dizzy and lightheaded and getting and presented himself to the emergency room.  CT scans and MRIs of the head were without any abnormality.  Notable was elevated platelets at 998 as well as elevation of AST and ALT and an ultrasound of the spleen showed a spleen size of 13.5 cm with a normal size is 9 x 10 x 11.  Next    Patient is being referred to hematology for further recommendations, after he was placed on aspirin 81 mg the margins from.   No history exists.     [No treatment plan]  Cancer Staging Information:  No matching staging information was found for the patient.      INTERVAL HISTORY  Patient ID: Devonte Lara is a 56 y.o. year old male This is a 56-year-old white male in the usual state of health about a week ago he felt dizzy and lightheaded and getting and presented himself to the emergency room.  CT scans and MRIs of the head were without any abnormality.  Notable was elevated platelets at 998 as well as elevation of AST and ALT and an ultrasound of the spleen showed a spleen size of 13.5 cm with a normal size is 9 x 10 x 11.         Review of Systems   Constitutional: Negative.    HENT: Negative.    Eyes: Negative.    Respiratory: Negative.    Cardiovascular: Negative.    Gastrointestinal: Negative.    Endocrine: Negative.    Genitourinary: Negative.    Musculoskeletal: Negative.    Skin: Negative.    Allergic/Immunologic: Negative.    Neurological: Negative.    Hematological: Negative.    Psychiatric/Behavioral: Negative.             Medications:    Current Outpatient Prescriptions   Medication Sig Dispense Refill    • aspirin 81 MG chewable tablet Chew 81 mg Daily.     • valsartan (DIOVAN) 160 MG tablet        No current facility-administered medications for this visit.        ALLERGIES:  No Known Allergies    Objective      @VITALS    Current Status 2/3/2017   ECOG score 0       General Appearance: Patient is awake, alert, oriented and in no acute distress. Patient is welldeveloped, wellnourished, and appears stated age.  HEENT: Normocephalic. Sclerae clear, conjunctiva pink, extraocular movements intact, pupils, round, reactive to light and  accommodation. Mouth and throat are clear with moist oral mucosa.  NECK: Supple, no jugular venous distention, thyroid not enlarged.  LYMPH: No cervical, supraclavicular, axillary, or inguinal lymphadenopathy.  CHEST: Equal bilateral expansion, AP  diameter normal, resonant percussion note  LUNGS: Good air movement, no rales, rhonchi, rubs or wheezes with auscultation  CARDIO: Regular sinus rhythm, no murmurs, gallops or rubs.  ABDOMEN: Nondistended, soft, No tenderness, no guarding, no rebound, No hepatosplenomegaly. No abdominal masses. Bowel sounds positive. No hernia  GENITALIA: Not examined.  BREASTS: Not examined.  MUSKEL: No joint swelling, decreased motion, or inflammation  EXTREMS: No edema, clubbing, cyanosis, No varicose veins.  NEURO: Grossly nonfocal, Gait is coordinated and smooth, Cognition is preserved.  SKIN: No rashes, no ecchymoses, no petechia.  PSYCH: Oriented to time, place and person. Memory is preserved. Mood and affect appear normal      RECENT LABS:  Hospital Outpatient Visit on 01/30/2017   Component Date Value Ref Range Status   • Creatinine 01/30/2017 1.00  0.60 - 1.30 mg/dL Final    Serial Number: [id number]    : [id number]       RADIOLOGY:  Ct Head Without Contrast    Result Date: 1/19/2017  Narrative: EXAMINATION:  CT HEAD WO CONTRAST-  1/19/2017 9:42 AM CST  HISTORY: Upper body numbness.  TECHNIQUE: Multiple axial images were obtained through the brain  without contrast infusion. Multiplanar images were reconstructed.  DLP: 820 mGy-cm. Automated dosage control was utilized.  COMPARISON: No comparison study.  FINDINGS: There are no hemorrhage, edema or mass effect. There is mild dilatation of the lateral and third ventricles. This may be developmental. There is no significant atrophy. The visualized paranasal sinuses and mastoid air cells are clear.      Impression: 1. No hemorrhage, edema or mass effect. 2. Mild dilatation of the lateral and third ventricles that may be developmental. No significant atrophy is seen. This could represent mild/early hydrocephalus.  The full report of this exam was immediately signed and available to the emergency room. The patient is currently in the emergency room.    Electronically Signed By-Dr. Hamilton Talbot MD On:1/19/2017 11:03 AM EST This report was finalized on 01/19/2017 10:03 by Dr. Hamilton Talbot MD.    Ct Abdomen With Contrast    Result Date: 1/30/2017  Narrative: CT ABDOMEN W CONTRAST- 1/30/2017 8:53 AM CST  HISTORY: LOOK FOR FATTY LIVER; D47.3-Essential (hemorrhagic) thrombocythemia  COMPARISON: None  DOSE LENGTH PRODUCT: 498 mGy cm. Automated exposure control was also utilized to decrease patient radiation dose.  TECHNIQUE: Axial images of the abdomen are obtained following IV and oral contrast. Images of the pelvis are not performed.  FINDINGS:  There is mild diffuse fatty infiltration of the liver. There is no suspicious focal liver mass. The spleen is borderline prominent measuring 13.4 x 7.4 x 12.7 cm in CC x W x AP dimensions. There is no focal splenic mass. The pancreas, gallbladder, and adrenal glands appear normal. There is no enhancing renal mass. There is no hydronephrosis.  No free air or loculated fluid seen within the upper abdomen. There is no visible bowel dilatation. A normal appendix is visualized. The abdominal aorta appears normal. An incidental small fatty containing umbilical hernia is identified.   Visible lung bases are unremarkable.  There are degenerative changes of the thoracolumbar spine.      Impression: 1. Mild fatty infiltration of the liver suspected diffusely. 2. Borderline splenomegaly with no focal splenic lesion. This report was finalized on 01/30/2017 09:41 by Dr. Romi Watt MD.    Mri Brain Without Contrast    Result Date: 1/19/2017  Narrative: EXAMINATION:  MRI BRAIN WO CONTRAST-  1/19/2017 1:30 PM CST  HISTORY: Mental status changes. Numbness.  TECHNIQUE: Multiplanar imaging was performed in a high field magnet.  COMPARISON: No comparison study.  FINDINGS: There is no evidence of acute infarct on the diffusion-weighted sequence. There are scattered areas of T2 high signal within the periventricular white matter that are nonspecific in a patient of this age. There are no brainstem or posterior fossa areas of signal abnormality. There is minimal atrophy. Mild dilatation of the lateral and third ventricles. On the sagittal images, there is a prominent pituitary gland within of early convex margin.      Impression: 1. There is no evidence of acute infarct on the diffusion sequence. 2. Scattered areas of T2 high signal within the periventricular white matter are nonspecific in a patient of this age. There are most likely due to chronic small vessel disease. Demyelinating disease is not excluded. 3. The pituitary gland is prominent for age. It measures about 1.1 cm in cephalocaudal dimension and has an upward in the convex margin. Pituitary protocol MRI is suggested to rule out a tumor. 4. Mild dilatation of the lateral and third ventricles. Minimal atrophy.  The full report of this exam was immediately signed and available to the emergency room. The patient is currently in the emergency room.  Electronically Signed By-Dr. Hamilton Talbot MD On:1/19/2017 3:23 PM EST This report was finalized on 01/19/2017 14:23 by Dr. Hamilton Talbot MD.    Us Spleen    Result Date: 1/19/2017  Narrative:  EXAMINATION:  US SPLEEN-  1/19/2017 12:34 PM CST  HISTORY: Thrombocytosis. Rule out splenomegaly.  COMPARISON: No comparison study.  TECHNIQUE: Multiple sonographic images were obtained.  FINDINGS: The spleen is borderline enlarged measuring 13.2 cm in length and about 5 cm in short axis diameter.      Impression: Borderline splenic enlargement, as described.  Electronically Signed By-Dr. Hamilton Talbot MD On:1/19/2017 2:16 PM EST This report was finalized on 01/19/2017 13:16 by Dr. Hamilton Talbot MD.    Mri Pituitary With & Without Contrast    Result Date: 1/23/2017  Narrative: EXAMINATION: MR OF THE SELLA WITH AND WITHOUT CONTRAST 01/23/2017  COMPARISON:  MRI of the brain dated 01/19/2017  HISTORY:   Male, 56 years-old. Pituitary mass.  TECHNIQUE: Routine pulse sequences were obtained of the brain and sella before and after the administration of IV contrast.  Dynamic postcontrast coronal images through the sella were obtained.  FINDINGS: The pituitary gland is enlarged and irregular in contour. Specifically, it measures 1.1 x 0.9 x 1 cm and shows homogeneous enhancement. No differential enhancement is identified on dynamic sequences within the gland to locate a microadenoma. There is mild suprasellar extension without convincing evidence of mass effect on the prechiasmatic optic nerves, chiasmatic or post chiasmatic optic tracts. There is no evidence of cavernous sinus invasion. There is minimal involvement of the infundibulum.  There is smooth plate-like thickening and enhancement along the sella. This is felt to reflect reactive changes related to which imaging of the underlying bone.      Impression: Enlarged homogeneously enhancing pituitary gland, without discrete differential enhancement to locate an adenoma. Favor pituitary hyperplasia versus adenoma. Meningioma is felt to be less likely given the involvement of the infundibulum (although minimal). Correlation with endocrine laboratory values recommended.  This report was finalized on 01/23/2017 17:37 by Dr. Barbie Kauffman MD.    Xr Chest 1 View    Result Date: 1/19/2017  Narrative: EXAMINATION:  XR CHEST 1 VW-  1/19/2017 9:43 AM CST  HISTORY: Weakness.  COMPARISON: No comparison study.  FINDINGS:  The lungs are expanded and clear. The heart size is normal. There is no vascular congestion or edema.  There is no significant bony abnormality seen.      Impression: Portable chest x-ray demonstrates no active disease.  Electronically Signed By-Dr. Hamilton Talbot MD On:1/19/2017 10:48 AM EST This report was finalized on 01/19/2017 09:48 by Dr. Hamilton Talbot MD.           Assessment/Plan        1.  Elevated platelets.  His platelet count is close to 1 million, especially on January 19 was 998, today's are pending.  From my experience when the platelet count is close to median this generally is associated with a primary bone marrow abnormality i.e. essential thrombocytosis, for which she may need a bone marrow biopsy to demonstrate presence of Ismael 2 mutation.  However other etiologies of elevated platelet could be Iron and deficiency.  Therefore before start on a bone marrow biopsy I need to check his serum ferritin and iron panel which will be done today.  I will also check his stools for occult blood ×3.  Once I have the levels back at that point I would make a decision about doing a bone marrow biopsy.    2.  Elevated liver function tests and splenomegaly.  Spleen is borderline enlarged at 13.5 cm, there is a normal size is 9 x 10 x 11. This gentleman weighs 256 pounds, ideal body weight should be 160 pounds considering that he is 6 feet tall.  Since he is morbidly obese I suspect that he may have Wallace.  Although he had an ultrasound of the spleen there was no mention of hyperechogenicity or evaluation of the liver.  I will request the radiology services at [facility] to relook at the liver ultrasound's for hyperechogenicity off the liver as well as color-flow  Doppler did not look at the direction of flow of the portal system whether it is towards the spleen orally from the spleen.  Many times fatty liver can cause simmering inflammation called Wallace and therefore flow towards the spleen.  Next    Patient also admits drinking alcohol and it could be alcoholic Toxicity and perhaps early development off cirrhosis.  Rule that out I will order a CT scan of the abdomen with IV contrast since his creatinine is normal.    Iron studies are normal.  Platelet count last week was over 900,000.  He is got splenomegaly which may or may not be related to primary myeloproliferative disorder, however he has got fatty liver which can also be contributing to flow towards the spleen.  Next    Considering the above I performed a bone marrow biopsy left iliac crest after obtaining an informed consent using local anesthesia 1% 2 mL.  Jamshidi needle was inserted and aspirate was obtained with a biopsy but adequate slides could not be made because of non-availability of the technician.  Will await the results of Ismael 2 mutation.  I advised the patient and his dear wife that I may have to repeat another bone marrow.                    Jonah Dai MD    2/3/2017    10:25 AM

## 2017-02-04 LAB
CHOLEST SERPL-MCNC: 159 MG/DL (ref 130–200)
HDLC SERPL-MCNC: 56 MG/DL
LDLC SERPL CALC-MCNC: 88 MG/DL (ref 0–99)
TRIGL SERPL-MCNC: 73 MG/DL (ref 0–149)
VLDLC SERPL CALC-MCNC: 14.6 MG/DL

## 2017-02-16 DIAGNOSIS — D47.3 THROMBOCYTHEMIA, ESSENTIAL (HCC): Primary | ICD-10-CM

## 2017-02-17 ENCOUNTER — OFFICE VISIT (OUTPATIENT)
Dept: ONCOLOGY | Facility: CLINIC | Age: 57
End: 2017-02-17

## 2017-02-17 ENCOUNTER — LAB (OUTPATIENT)
Dept: ONCOLOGY | Facility: CLINIC | Age: 57
End: 2017-02-17

## 2017-02-17 VITALS
BODY MASS INDEX: 33.39 KG/M2 | HEART RATE: 96 BPM | SYSTOLIC BLOOD PRESSURE: 132 MMHG | DIASTOLIC BLOOD PRESSURE: 84 MMHG | OXYGEN SATURATION: 99 % | TEMPERATURE: 98.1 F | HEIGHT: 72 IN | WEIGHT: 246.5 LBS | RESPIRATION RATE: 16 BRPM

## 2017-02-17 DIAGNOSIS — D47.3 THROMBOCYTHEMIA, ESSENTIAL (HCC): ICD-10-CM

## 2017-02-17 DIAGNOSIS — D47.3 ESSENTIAL THROMBOCYTOSIS (HCC): Primary | ICD-10-CM

## 2017-02-17 DIAGNOSIS — D75.839 THROMBOCYTHEMIA: Primary | ICD-10-CM

## 2017-02-17 LAB
ALBUMIN SERPL-MCNC: 4.6 G/DL (ref 3.5–5)
ALBUMIN/GLOB SERPL: 1.5 G/DL
ALP SERPL-CCNC: 88 U/L (ref 38–126)
ALT SERPL W P-5'-P-CCNC: 93 U/L (ref 21–72)
ANION GAP SERPL CALCULATED.3IONS-SCNC: 13 MMOL/L
AST SERPL-CCNC: 66 U/L (ref 5–40)
AUTO MIXED CELLS #: 0.8 10*3/UL (ref 0.1–1.5)
AUTO MIXED CELLS %: 9 % (ref 0.2–15.1)
BILIRUB SERPL-MCNC: 1 MG/DL (ref 0.2–1.3)
BUN BLD-MCNC: 8 MG/DL (ref 9–21)
BUN/CREAT SERPL: 8.9 (ref 7–25)
CALCIUM SPEC-SCNC: 9.3 MG/DL (ref 8.4–10.2)
CHLORIDE SERPL-SCNC: 104 MMOL/L (ref 98–107)
CO2 SERPL-SCNC: 26 MMOL/L (ref 22–30)
CREAT BLD-MCNC: 0.9 MG/DL (ref 0.8–1.5)
ERYTHROCYTE [DISTWIDTH] IN BLOOD BY AUTOMATED COUNT: 17 % (ref 11.5–14.5)
GFR SERPL CREATININE-BSD FRML MDRD: 88 ML/MIN/1.73
GLOBULIN UR ELPH-MCNC: 3 GM/DL
GLUCOSE BLD-MCNC: 106 MG/DL (ref 75–110)
HCT VFR BLD AUTO: 42.2 % (ref 42–52)
HGB BLD-MCNC: 14 G/DL (ref 14–18)
LYMPHOCYTES # BLD AUTO: 3.5 10*3/MM3 (ref 0.8–7)
LYMPHOCYTES NFR BLD AUTO: 38.6 % (ref 10–58.5)
MCH RBC QN AUTO: 31 PG (ref 27–31)
MCHC RBC AUTO-ENTMCNC: 33.2 G/DL (ref 33–37)
MCV RBC AUTO: 93.6 FL (ref 80–94)
NEUTROPHILS # BLD AUTO: 4.7 10*3/MM3 (ref 2–7.8)
NEUTROPHILS NFR BLD AUTO: 52.4 % (ref 37–92)
PLATELET # BLD AUTO: 948 10*3/MM3 (ref 130–400)
PMV BLD AUTO: 7.8 FL (ref 6–12)
POTASSIUM BLD-SCNC: 4.1 MMOL/L (ref 3.6–5)
PROT SERPL-MCNC: 7.6 G/DL (ref 6.3–8.2)
RBC # BLD AUTO: 4.51 10*6/MM3 (ref 4.7–6.1)
SODIUM BLD-SCNC: 143 MMOL/L (ref 137–145)
WBC NRBC COR # BLD: 9 10*3/MM3 (ref 4.8–10.8)

## 2017-02-17 PROCEDURE — 85025 COMPLETE CBC W/AUTO DIFF WBC: CPT | Performed by: INTERNAL MEDICINE

## 2017-02-17 PROCEDURE — 99214 OFFICE O/P EST MOD 30 MIN: CPT | Performed by: INTERNAL MEDICINE

## 2017-02-17 PROCEDURE — 80053 COMPREHEN METABOLIC PANEL: CPT | Performed by: INTERNAL MEDICINE

## 2017-02-17 PROCEDURE — 36415 COLL VENOUS BLD VENIPUNCTURE: CPT | Performed by: INTERNAL MEDICINE

## 2017-02-17 RX ORDER — HYDROXYUREA 500 MG/1
1000 CAPSULE ORAL DAILY
Qty: 60 CAPSULE | Refills: 5 | Status: SHIPPED | OUTPATIENT
Start: 2017-02-17 | End: 2017-03-06 | Stop reason: SDUPTHER

## 2017-02-17 RX ORDER — INDOMETHACIN 50 MG/1
CAPSULE ORAL
COMMUNITY
Start: 2017-01-09 | End: 2017-02-17

## 2017-02-17 NOTE — PROGRESS NOTES
[facility]  HEMATOLOGY & ONCOLOGY        Subjective     VISIT DIAGNOSIS: Thrombocytosis etiology unclear  Splenomegaly with elevated liver function enzymes, etiology unclear  No diagnosis found.    REASON FOR VISIT:   No chief complaint on file.       HEMATOLOGY / ONCOLOGY HISTORY: This is a 56-year-old white male in the usual state of health about a week ago he felt dizzy and lightheaded and getting and presented himself to the emergency room.  CT scans and MRIs of the head were without any abnormality.  Notable was elevated platelets at 998 as well as elevation of AST and ALT and an ultrasound of the spleen showed a spleen size of 13.5 cm with a normal size is 9 x 10 x 11.  Next    Patient is being referred to hematology for further recommendations, after he was placed on aspirin 81 mg the margins from.   No history exists.     [No treatment plan]  Cancer Staging Information:  No matching staging information was found for the patient.      INTERVAL HISTORY  Patient ID: Devonte Lara is a 56 y.o. year old male This is a 56-year-old white male in the usual state of health about a week ago he felt dizzy and lightheaded and getting and presented himself to the emergency room.  CT scans and MRIs of the head were without any abnormality.  Notable was elevated platelets at 998 as well as elevation of AST and ALT and an ultrasound of the spleen showed a spleen size of 13.5 cm with a normal size is 9 x 10 x 11.         Review of Systems   Constitutional: Negative.    HENT: Negative.    Eyes: Negative.    Respiratory: Negative.    Cardiovascular: Negative.    Gastrointestinal: Negative.    Endocrine: Negative.    Genitourinary: Negative.    Musculoskeletal: Negative.    Skin: Negative.    Allergic/Immunologic: Negative.    Neurological: Negative.    Hematological: Negative.    Psychiatric/Behavioral: Negative.             Medications:    Current Outpatient Prescriptions   Medication Sig Dispense Refill    • aspirin 81 MG chewable tablet Chew 81 mg Daily.     • valsartan (DIOVAN) 160 MG tablet        No current facility-administered medications for this visit.        ALLERGIES:  No Known Allergies    Objective      @VITALS    Current Status 2/17/2017   ECOG score 0       General Appearance: Patient is awake, alert, oriented and in no acute distress. Patient is welldeveloped, wellnourished, and appears stated age.  HEENT: Normocephalic. Sclerae clear, conjunctiva pink, extraocular movements intact, pupils, round, reactive to light and  accommodation. Mouth and throat are clear with moist oral mucosa.  NECK: Supple, no jugular venous distention, thyroid not enlarged.  LYMPH: No cervical, supraclavicular, axillary, or inguinal lymphadenopathy.  CHEST: Equal bilateral expansion, AP  diameter normal, resonant percussion note  LUNGS: Good air movement, no rales, rhonchi, rubs or wheezes with auscultation  CARDIO: Regular sinus rhythm, no murmurs, gallops or rubs.  ABDOMEN: Nondistended, soft, No tenderness, no guarding, no rebound, No hepatosplenomegaly. No abdominal masses. Bowel sounds positive. No hernia  GENITALIA: Not examined.  BREASTS: Not examined.  MUSKEL: No joint swelling, decreased motion, or inflammation  EXTREMS: No edema, clubbing, cyanosis, No varicose veins.  NEURO: Grossly nonfocal, Gait is coordinated and smooth, Cognition is preserved.  SKIN: No rashes, no ecchymoses, no petechia.  PSYCH: Oriented to time, place and person. Memory is preserved. Mood and affect appear normal      RECENT LABS:  Lab on 02/17/2017   Component Date Value Ref Range Status   • WBC 02/17/2017 9.00  4.80 - 10.80 10*3/mm3 Final   • RBC 02/17/2017 4.51* 4.70 - 6.10 10*6/mm3 Final   • Hemoglobin 02/17/2017 14.0  14.0 - 18.0 g/dL Final   • Hematocrit 02/17/2017 42.2  42.0 - 52.0 % Final   • MCV 02/17/2017 93.6  80.0 - 94.0 fL Final   • MCH 02/17/2017 31.0  27.0 - 31.0 pg Final   • MCHC 02/17/2017 33.2  33.0 - 37.0 g/dL Final   •  RDW 02/17/2017 17.0* 11.5 - 14.5 % Final   • MPV 02/17/2017 7.8  6.0 - 12.0 fL Final   • Platelets 02/17/2017 948* 130 - 400 10*3/mm3 Final    Delta checked by CR 2/17/17.   • Neutrophil % 02/17/2017 52.4  37.0 - 92.0 % Final   • Lymphocyte % 02/17/2017 38.6  10.0 - 58.5 % Final   • Auto Mixed Cells % 02/17/2017 9.0  0.2 - 15.1 % Final   • Neutrophils, Absolute 02/17/2017 4.70  2.00 - 7.80 10*3/mm3 Final   • Lymphocytes, Absolute 02/17/2017 3.50  0.80 - 7.00 10*3/mm3 Final   • Auto Mixed Cells # 02/17/2017 0.80  0.10 - 1.50 10*3/uL Final       RADIOLOGY:  Ct Head Without Contrast    Result Date: 1/19/2017  Narrative: EXAMINATION:  CT HEAD WO CONTRAST-  1/19/2017 9:42 AM CST  HISTORY: Upper body numbness.  TECHNIQUE: Multiple axial images were obtained through the brain without contrast infusion. Multiplanar images were reconstructed.  DLP: 820 mGy-cm. Automated dosage control was utilized.  COMPARISON: No comparison study.  FINDINGS: There are no hemorrhage, edema or mass effect. There is mild dilatation of the lateral and third ventricles. This may be developmental. There is no significant atrophy. The visualized paranasal sinuses and mastoid air cells are clear.      Impression: 1. No hemorrhage, edema or mass effect. 2. Mild dilatation of the lateral and third ventricles that may be developmental. No significant atrophy is seen. This could represent mild/early hydrocephalus.  The full report of this exam was immediately signed and available to the emergency room. The patient is currently in the emergency room.    Electronically Signed By-Dr. Hamilton Talbot MD On:1/19/2017 11:03 AM EST This report was finalized on 01/19/2017 10:03 by Dr. Hamilton Talbot MD.    Ct Abdomen With Contrast    Result Date: 1/30/2017  Narrative: CT ABDOMEN W CONTRAST- 1/30/2017 8:53 AM CST  HISTORY: LOOK FOR FATTY LIVER; D47.3-Essential (hemorrhagic) thrombocythemia  COMPARISON: None  DOSE LENGTH PRODUCT: 498 mGy cm. Automated exposure  control was also utilized to decrease patient radiation dose.  TECHNIQUE: Axial images of the abdomen are obtained following IV and oral contrast. Images of the pelvis are not performed.  FINDINGS:  There is mild diffuse fatty infiltration of the liver. There is no suspicious focal liver mass. The spleen is borderline prominent measuring 13.4 x 7.4 x 12.7 cm in CC x W x AP dimensions. There is no focal splenic mass. The pancreas, gallbladder, and adrenal glands appear normal. There is no enhancing renal mass. There is no hydronephrosis.  No free air or loculated fluid seen within the upper abdomen. There is no visible bowel dilatation. A normal appendix is visualized. The abdominal aorta appears normal. An incidental small fatty containing umbilical hernia is identified.  Visible lung bases are unremarkable.  There are degenerative changes of the thoracolumbar spine.      Impression: 1. Mild fatty infiltration of the liver suspected diffusely. 2. Borderline splenomegaly with no focal splenic lesion. This report was finalized on 01/30/2017 09:41 by Dr. Romi Watt MD.    Mri Brain Without Contrast    Result Date: 1/19/2017  Narrative: EXAMINATION:  MRI BRAIN WO CONTRAST-  1/19/2017 1:30 PM CST  HISTORY: Mental status changes. Numbness.  TECHNIQUE: Multiplanar imaging was performed in a high field magnet.  COMPARISON: No comparison study.  FINDINGS: There is no evidence of acute infarct on the diffusion-weighted sequence. There are scattered areas of T2 high signal within the periventricular white matter that are nonspecific in a patient of this age. There are no brainstem or posterior fossa areas of signal abnormality. There is minimal atrophy. Mild dilatation of the lateral and third ventricles. On the sagittal images, there is a prominent pituitary gland within of early convex margin.      Impression: 1. There is no evidence of acute infarct on the diffusion sequence. 2. Scattered areas of T2 high signal within  the periventricular white matter are nonspecific in a patient of this age. There are most likely due to chronic small vessel disease. Demyelinating disease is not excluded. 3. The pituitary gland is prominent for age. It measures about 1.1 cm in cephalocaudal dimension and has an upward in the convex margin. Pituitary protocol MRI is suggested to rule out a tumor. 4. Mild dilatation of the lateral and third ventricles. Minimal atrophy.  The full report of this exam was immediately signed and available to the emergency room. The patient is currently in the emergency room.  Electronically Signed By-Dr. Hamilton Talbot MD On:1/19/2017 3:23 PM EST This report was finalized on 01/19/2017 14:23 by Dr. Hamilton Talbot MD.    Us Spleen    Result Date: 1/19/2017  Narrative: EXAMINATION:  US SPLEEN-  1/19/2017 12:34 PM CST  HISTORY: Thrombocytosis. Rule out splenomegaly.  COMPARISON: No comparison study.  TECHNIQUE: Multiple sonographic images were obtained.  FINDINGS: The spleen is borderline enlarged measuring 13.2 cm in length and about 5 cm in short axis diameter.      Impression: Borderline splenic enlargement, as described.  Electronically Signed By-Dr. Hamilton Talbot MD On:1/19/2017 2:16 PM EST This report was finalized on 01/19/2017 13:16 by Dr. Hamilton Talbot MD.    Mri Pituitary With & Without Contrast    Result Date: 1/23/2017  Narrative: EXAMINATION: MR OF THE SELLA WITH AND WITHOUT CONTRAST 01/23/2017  COMPARISON:  MRI of the brain dated 01/19/2017  HISTORY:   Male, 56 years-old. Pituitary mass.  TECHNIQUE: Routine pulse sequences were obtained of the brain and sella before and after the administration of IV contrast.  Dynamic postcontrast coronal images through the sella were obtained.  FINDINGS: The pituitary gland is enlarged and irregular in contour. Specifically, it measures 1.1 x 0.9 x 1 cm and shows homogeneous enhancement. No differential enhancement is identified on dynamic sequences within the gland to  locate a microadenoma. There is mild suprasellar extension without convincing evidence of mass effect on the prechiasmatic optic nerves, chiasmatic or post chiasmatic optic tracts. There is no evidence of cavernous sinus invasion. There is minimal involvement of the infundibulum.  There is smooth plate-like thickening and enhancement along the sella. This is felt to reflect reactive changes related to which imaging of the underlying bone.      Impression: Enlarged homogeneously enhancing pituitary gland, without discrete differential enhancement to locate an adenoma. Favor pituitary hyperplasia versus adenoma. Meningioma is felt to be less likely given the involvement of the infundibulum (although minimal). Correlation with endocrine laboratory values recommended. This report was finalized on 01/23/2017 17:37 by Dr. Barbie Kauffman MD.    Xr Chest 1 View    Result Date: 1/19/2017  Narrative: EXAMINATION:  XR CHEST 1 VW-  1/19/2017 9:43 AM CST  HISTORY: Weakness.  COMPARISON: No comparison study.  FINDINGS:  The lungs are expanded and clear. The heart size is normal. There is no vascular congestion or edema.  There is no significant bony abnormality seen.      Impression: Portable chest x-ray demonstrates no active disease.  Electronically Signed By-Dr. Hamilton Talbot MD On:1/19/2017 10:48 AM EST This report was finalized on 01/19/2017 09:48 by Dr. Hamilton Talbot MD.           Assessment/Plan        1.  Elevated platelets.  His platelet count is close to 1 million, especially on January 19 was 998, today's are pending.  From my experience when the platelet count is close to median this generally is associated with a primary bone marrow abnormality i.e. essential thrombocytosis, for which she may need a bone marrow biopsy to demonstrate presence of Ismael 2 mutation.  However other etiologies of elevated platelet could be Iron and deficiency.  Therefore before start on a bone marrow biopsy I need to check his serum  ferritin and iron panel which will be done today.  I will also check his stools for occult blood ×3.  Once I have the levels back at that point I would make a decision about doing a bone marrow biopsy.    2.  Elevated liver function tests and splenomegaly.  Spleen is borderline enlarged at 13.5 cm, there is a normal size is 9 x 10 x 11. This gentleman weighs 256 pounds, ideal body weight should be 160 pounds considering that he is 6 feet tall.  Since he is morbidly obese I suspect that he may have Wallace.  Although he had an ultrasound of the spleen there was no mention of hyperechogenicity or evaluation of the liver.  I will request the radiology services at [facility] to relook at the liver ultrasound's for hyperechogenicity off the liver as well as color-flow Doppler did not look at the direction of flow of the portal system whether it is towards the spleen orally from the spleen.  Many times fatty liver can cause simmering inflammation called Wallace and therefore flow towards the spleen.  Next    Patient also admits drinking alcohol and it could be alcoholic Toxicity and perhaps early development off cirrhosis.  Rule that out I will order a CT scan of the abdomen with IV contrast since his creatinine is normal.    Iron studies are normal.  Platelet count last week was over 900,000.  He is got splenomegaly which may or may not be related to primary myeloproliferative disorder, however he has got fatty liver which can also be contributing to flow towards the spleen.     Last week he underwent a bone marrow biopsy that shows normal cytogenetics, normal trilineage myelopoiesis however he has CALR  gene mutation.  Is present in 40-50% of patients with essential thrombocythemia.  Although he is Ismael 2 negative.  Considering this I would have put him on hydroxyurea 1000 mg by mouth daily and the goal is to keep the platelet count less than 500,000+ he must take one baby aspirin 81 mg daily.  I will check him back in  about 2 weeks' time with repeat CBC.                Jonah Dai MD    2/17/2017    9:31 AM

## 2017-03-03 DIAGNOSIS — D47.3 THROMBOCYTHEMIA, ESSENTIAL (HCC): Primary | ICD-10-CM

## 2017-03-06 ENCOUNTER — OFFICE VISIT (OUTPATIENT)
Dept: ONCOLOGY | Facility: CLINIC | Age: 57
End: 2017-03-06

## 2017-03-06 ENCOUNTER — LAB (OUTPATIENT)
Dept: ONCOLOGY | Facility: CLINIC | Age: 57
End: 2017-03-06

## 2017-03-06 VITALS
RESPIRATION RATE: 18 BRPM | SYSTOLIC BLOOD PRESSURE: 126 MMHG | HEIGHT: 72 IN | BODY MASS INDEX: 32.23 KG/M2 | TEMPERATURE: 98.3 F | HEART RATE: 88 BPM | WEIGHT: 238 LBS | OXYGEN SATURATION: 97 % | DIASTOLIC BLOOD PRESSURE: 72 MMHG

## 2017-03-06 DIAGNOSIS — D47.3 ESSENTIAL THROMBOCYTOSIS (HCC): Primary | ICD-10-CM

## 2017-03-06 DIAGNOSIS — D47.3 THROMBOCYTHEMIA, ESSENTIAL (HCC): ICD-10-CM

## 2017-03-06 LAB
AUTO MIXED CELLS #: 0.7 10*3/UL (ref 0.1–1.5)
AUTO MIXED CELLS %: 7.8 % (ref 0.2–15.1)
ERYTHROCYTE [DISTWIDTH] IN BLOOD BY AUTOMATED COUNT: 17.3 % (ref 11.5–14.5)
HCT VFR BLD AUTO: 40.2 % (ref 42–52)
HGB BLD-MCNC: 13.5 G/DL (ref 14–18)
LYMPHOCYTES # BLD AUTO: 2.6 10*3/MM3 (ref 0.8–7)
LYMPHOCYTES NFR BLD AUTO: 28.6 % (ref 10–58.5)
MCH RBC QN AUTO: 31.5 PG (ref 27–31)
MCHC RBC AUTO-ENTMCNC: 33.6 G/DL (ref 33–37)
MCV RBC AUTO: 94 FL (ref 80–94)
NEUTROPHILS # BLD AUTO: 5.8 10*3/MM3 (ref 2–7.8)
NEUTROPHILS NFR BLD AUTO: 63.6 % (ref 37–92)
PLATELET # BLD AUTO: 786 10*3/MM3 (ref 130–400)
PMV BLD AUTO: 7.9 FL (ref 6–12)
RBC # BLD AUTO: 4.28 10*6/MM3 (ref 4.7–6.1)
WBC NRBC COR # BLD: 9.1 10*3/MM3 (ref 4.8–10.8)

## 2017-03-06 PROCEDURE — 85025 COMPLETE CBC W/AUTO DIFF WBC: CPT | Performed by: INTERNAL MEDICINE

## 2017-03-06 PROCEDURE — 99213 OFFICE O/P EST LOW 20 MIN: CPT | Performed by: INTERNAL MEDICINE

## 2017-03-06 PROCEDURE — 80053 COMPREHEN METABOLIC PANEL: CPT | Performed by: INTERNAL MEDICINE

## 2017-03-06 PROCEDURE — 36415 COLL VENOUS BLD VENIPUNCTURE: CPT | Performed by: INTERNAL MEDICINE

## 2017-03-06 RX ORDER — HYDROXYUREA 500 MG/1
CAPSULE ORAL
Qty: 60 CAPSULE | Refills: 5 | Status: SHIPPED | OUTPATIENT
Start: 2017-03-06 | End: 2017-09-03 | Stop reason: SDUPTHER

## 2017-03-06 NOTE — PROGRESS NOTES
[facility]  HEMATOLOGY & ONCOLOGY        Subjective     VISIT DIAGNOSIS: Thrombocytosis etiology unclear  Splenomegaly with elevated liver function enzymes, etiology unclear  No diagnosis found.    REASON FOR VISIT:   No chief complaint on file.       HEMATOLOGY / ONCOLOGY HISTORY: This is a 56-year-old white male in the usual state of health about a week ago he felt dizzy and lightheaded and getting and presented himself to the emergency room.  CT scans and MRIs of the head were without any abnormality.  Notable was elevated platelets at 998 as well as elevation of AST and ALT and an ultrasound of the spleen showed a spleen size of 13.5 cm with a normal size is 9 x 10 x 11.  Next    Patient is being referred to hematology for further recommendations, after he was placed on aspirin 81 mg the margins from.   No history exists.     [No treatment plan]  Cancer Staging Information:  No matching staging information was found for the patient.      INTERVAL HISTORY  Patient ID: Devonte Lara is a 56 y.o. year old male This is a 56-year-old white male in the usual state of health about a week ago he felt dizzy and lightheaded and getting and presented himself to the emergency room.  CT scans and MRIs of the head were without any abnormality.  Notable was elevated platelets at 998 as well as elevation of AST and ALT and an ultrasound of the spleen showed a spleen size of 13.5 cm with a normal size is 9 x 10 x 11.         Review of Systems   Constitutional: Negative.    HENT: Negative.    Eyes: Negative.    Respiratory: Negative.    Cardiovascular: Negative.    Gastrointestinal: Negative.    Endocrine: Negative.    Genitourinary: Negative.    Musculoskeletal: Negative.    Skin: Negative.    Allergic/Immunologic: Negative.    Neurological: Negative.    Hematological: Negative.    Psychiatric/Behavioral: Negative.             Medications:    Current Outpatient Prescriptions   Medication Sig Dispense Refill    • aspirin 81 MG chewable tablet Chew 81 mg Daily.     • hydroxyurea (HYDREA) 500 MG capsule Take 2 capsules by mouth Daily. 60 capsule 5   • valsartan (DIOVAN) 160 MG tablet        No current facility-administered medications for this visit.        ALLERGIES:  No Known Allergies    Objective      @VITALS    Current Status 2/17/2017   ECOG score 0       General Appearance: Patient is awake, alert, oriented and in no acute distress. Patient is welldeveloped, wellnourished, and appears stated age.  HEENT: Normocephalic. Sclerae clear, conjunctiva pink, extraocular movements intact, pupils, round, reactive to light and  accommodation. Mouth and throat are clear with moist oral mucosa.  NECK: Supple, no jugular venous distention, thyroid not enlarged.  LYMPH: No cervical, supraclavicular, axillary, or inguinal lymphadenopathy.  CHEST: Equal bilateral expansion, AP  diameter normal, resonant percussion note  LUNGS: Good air movement, no rales, rhonchi, rubs or wheezes with auscultation  CARDIO: Regular sinus rhythm, no murmurs, gallops or rubs.  ABDOMEN: Nondistended, soft, No tenderness, no guarding, no rebound, No hepatosplenomegaly. No abdominal masses. Bowel sounds positive. No hernia  GENITALIA: Not examined.  BREASTS: Not examined.  MUSKEL: No joint swelling, decreased motion, or inflammation  EXTREMS: No edema, clubbing, cyanosis, No varicose veins.  NEURO: Grossly nonfocal, Gait is coordinated and smooth, Cognition is preserved.  SKIN: No rashes, no ecchymoses, no petechia.  PSYCH: Oriented to time, place and person. Memory is preserved. Mood and affect appear normal      RECENT LABS:  No visits with results within 7 Day(s) from this visit.  Latest known visit with results is:    Lab on 02/17/2017   Component Date Value Ref Range Status   • Glucose 02/17/2017 106  75 - 110 mg/dL Final   • BUN 02/17/2017 8* 9 - 21 mg/dL Final   • Creatinine 02/17/2017 0.90  0.80 - 1.50 mg/dL Final   • Sodium 02/17/2017 143  137 -  145 mmol/L Final   • Potassium 02/17/2017 4.1  3.6 - 5.0 mmol/L Final   • Chloride 02/17/2017 104  98 - 107 mmol/L Final   • CO2 02/17/2017 26.0  22.0 - 30.0 mmol/L Final   • Calcium 02/17/2017 9.3  8.4 - 10.2 mg/dL Final   • Total Protein 02/17/2017 7.6  6.3 - 8.2 g/dL Final   • Albumin 02/17/2017 4.60  3.50 - 5.00 g/dL Final   • ALT (SGPT) 02/17/2017 93* 21 - 72 U/L Final   • AST (SGOT) 02/17/2017 66* 5 - 40 U/L Final   • Alkaline Phosphatase 02/17/2017 88  38 - 126 U/L Final   • Total Bilirubin 02/17/2017 1.0  0.2 - 1.3 mg/dL Final   • eGFR Non African Amer 02/17/2017 88  >60 mL/min/1.73 Final   • Globulin 02/17/2017 3.0  gm/dL Final   • A/G Ratio 02/17/2017 1.5  g/dL Final   • BUN/Creatinine Ratio 02/17/2017 8.9  7.0 - 25.0 Final   • Anion Gap 02/17/2017 13.0  mmol/L Final   • WBC 02/17/2017 9.00  4.80 - 10.80 10*3/mm3 Final   • RBC 02/17/2017 4.51* 4.70 - 6.10 10*6/mm3 Final   • Hemoglobin 02/17/2017 14.0  14.0 - 18.0 g/dL Final   • Hematocrit 02/17/2017 42.2  42.0 - 52.0 % Final   • MCV 02/17/2017 93.6  80.0 - 94.0 fL Final   • MCH 02/17/2017 31.0  27.0 - 31.0 pg Final   • MCHC 02/17/2017 33.2  33.0 - 37.0 g/dL Final   • RDW 02/17/2017 17.0* 11.5 - 14.5 % Final   • MPV 02/17/2017 7.8  6.0 - 12.0 fL Final   • Platelets 02/17/2017 948* 130 - 400 10*3/mm3 Final    Delta checked by CR 2/17/17.   • Neutrophil % 02/17/2017 52.4  37.0 - 92.0 % Final   • Lymphocyte % 02/17/2017 38.6  10.0 - 58.5 % Final   • Auto Mixed Cells % 02/17/2017 9.0  0.2 - 15.1 % Final   • Neutrophils, Absolute 02/17/2017 4.70  2.00 - 7.80 10*3/mm3 Final   • Lymphocytes, Absolute 02/17/2017 3.50  0.80 - 7.00 10*3/mm3 Final   • Auto Mixed Cells # 02/17/2017 0.80  0.10 - 1.50 10*3/uL Final       RADIOLOGY:  No results found.         Assessment/Plan     Essential thrombocytosis with CLAR gene positive.  Currently on 1000 mg of hydroxyurea daily, platelet count down to 786,000.  The goal is to bring the platelet count less than 500,000.  Therefore  I would advised him to increase the dose of hydroxyurea to 1500 mg daily and continue one baby aspirin every day and recheck a CBC in 1 month's time.                Jonah Dai MD    3/6/2017    3:06 PM

## 2017-03-07 LAB
ALBUMIN SERPL-MCNC: 4.6 G/DL (ref 3.5–5)
ALBUMIN/GLOB SERPL: 1.5 G/DL
ALP SERPL-CCNC: 64 U/L (ref 38–126)
ALT SERPL W P-5'-P-CCNC: 67 U/L (ref 21–72)
ANION GAP SERPL CALCULATED.3IONS-SCNC: 16 MMOL/L
AST SERPL-CCNC: 57 U/L (ref 5–40)
BILIRUB SERPL-MCNC: 0.9 MG/DL (ref 0.2–1.3)
BUN BLD-MCNC: 17 MG/DL (ref 9–21)
BUN/CREAT SERPL: 17 (ref 7–25)
CALCIUM SPEC-SCNC: 9.2 MG/DL (ref 8.4–10.2)
CHLORIDE SERPL-SCNC: 98 MMOL/L (ref 98–107)
CO2 SERPL-SCNC: 27 MMOL/L (ref 22–30)
CREAT BLD-MCNC: 1 MG/DL (ref 0.8–1.5)
GFR SERPL CREATININE-BSD FRML MDRD: 78 ML/MIN/1.73
GLOBULIN UR ELPH-MCNC: 3.1 GM/DL
GLUCOSE BLD-MCNC: 101 MG/DL (ref 75–110)
POTASSIUM BLD-SCNC: 4.1 MMOL/L (ref 3.6–5)
PROT SERPL-MCNC: 7.7 G/DL (ref 6.3–8.2)
SODIUM BLD-SCNC: 141 MMOL/L (ref 137–145)

## 2017-04-05 DIAGNOSIS — D47.3 THROMBOCYTHEMIA, ESSENTIAL (HCC): Primary | ICD-10-CM

## 2017-04-06 ENCOUNTER — OFFICE VISIT (OUTPATIENT)
Dept: ONCOLOGY | Facility: CLINIC | Age: 57
End: 2017-04-06

## 2017-04-06 ENCOUNTER — LAB (OUTPATIENT)
Dept: ONCOLOGY | Facility: CLINIC | Age: 57
End: 2017-04-06

## 2017-04-06 VITALS
DIASTOLIC BLOOD PRESSURE: 84 MMHG | WEIGHT: 239.5 LBS | BODY MASS INDEX: 32.44 KG/M2 | TEMPERATURE: 97.9 F | SYSTOLIC BLOOD PRESSURE: 148 MMHG | OXYGEN SATURATION: 98 % | HEIGHT: 72 IN | RESPIRATION RATE: 16 BRPM | HEART RATE: 80 BPM

## 2017-04-06 DIAGNOSIS — D47.3 ESSENTIAL THROMBOCYTOSIS (HCC): ICD-10-CM

## 2017-04-06 DIAGNOSIS — D47.3 ESSENTIAL THROMBOCYTOSIS (HCC): Primary | ICD-10-CM

## 2017-04-06 DIAGNOSIS — D47.3 THROMBOCYTHEMIA, ESSENTIAL (HCC): ICD-10-CM

## 2017-04-06 DIAGNOSIS — D75.839 THROMBOCYTHEMIA: Primary | ICD-10-CM

## 2017-04-06 LAB
ALBUMIN SERPL-MCNC: 4.7 G/DL (ref 3.5–5)
ALBUMIN/GLOB SERPL: 1.7 G/DL
ALP SERPL-CCNC: 73 U/L (ref 38–126)
ALT SERPL W P-5'-P-CCNC: 62 U/L (ref 21–72)
ANION GAP SERPL CALCULATED.3IONS-SCNC: 12 MMOL/L
AST SERPL-CCNC: 51 U/L (ref 5–40)
AUTO MIXED CELLS #: 0.5 10*3/UL (ref 0.1–1.5)
AUTO MIXED CELLS %: 8.6 % (ref 0.2–15.1)
BILIRUB SERPL-MCNC: 0.9 MG/DL (ref 0.2–1.3)
BUN BLD-MCNC: 17 MG/DL (ref 9–21)
BUN/CREAT SERPL: 18.9 (ref 7–25)
CALCIUM SPEC-SCNC: 9.1 MG/DL (ref 8.4–10.2)
CHLORIDE SERPL-SCNC: 102 MMOL/L (ref 98–107)
CO2 SERPL-SCNC: 27 MMOL/L (ref 22–30)
CREAT BLD-MCNC: 0.9 MG/DL (ref 0.8–1.5)
ERYTHROCYTE [DISTWIDTH] IN BLOOD BY AUTOMATED COUNT: 20.3 % (ref 11.5–14.5)
GFR SERPL CREATININE-BSD FRML MDRD: 88 ML/MIN/1.73
GLOBULIN UR ELPH-MCNC: 2.7 GM/DL
GLUCOSE BLD-MCNC: 98 MG/DL (ref 75–110)
HCT VFR BLD AUTO: 37 % (ref 42–52)
HGB BLD-MCNC: 12.2 G/DL (ref 14–18)
LYMPHOCYTES # BLD AUTO: 2.2 10*3/MM3 (ref 0.8–7)
LYMPHOCYTES NFR BLD AUTO: 38.1 % (ref 10–58.5)
MCH RBC QN AUTO: 33 PG (ref 27–31)
MCHC RBC AUTO-ENTMCNC: 33 G/DL (ref 33–37)
MCV RBC AUTO: 99.9 FL (ref 80–94)
NEUTROPHILS # BLD AUTO: 3.1 10*3/MM3 (ref 2–7.8)
NEUTROPHILS NFR BLD AUTO: 53.3 % (ref 37–92)
PLATELET # BLD AUTO: 414 10*3/MM3 (ref 130–400)
PMV BLD AUTO: 7.8 FL (ref 6–12)
POTASSIUM BLD-SCNC: 4.1 MMOL/L (ref 3.6–5)
PROT SERPL-MCNC: 7.4 G/DL (ref 6.3–8.2)
RBC # BLD AUTO: 3.7 10*6/MM3 (ref 4.7–6.1)
SODIUM BLD-SCNC: 141 MMOL/L (ref 137–145)
WBC NRBC COR # BLD: 5.9 10*3/MM3 (ref 4.8–10.8)

## 2017-04-06 PROCEDURE — 99214 OFFICE O/P EST MOD 30 MIN: CPT | Performed by: INTERNAL MEDICINE

## 2017-04-06 PROCEDURE — 36415 COLL VENOUS BLD VENIPUNCTURE: CPT | Performed by: INTERNAL MEDICINE

## 2017-04-06 PROCEDURE — 85025 COMPLETE CBC W/AUTO DIFF WBC: CPT | Performed by: INTERNAL MEDICINE

## 2017-04-06 PROCEDURE — 80053 COMPREHEN METABOLIC PANEL: CPT | Performed by: INTERNAL MEDICINE

## 2017-04-06 NOTE — PROGRESS NOTES
[facility]  HEMATOLOGY & ONCOLOGY        Subjective     VISIT DIAGNOSIS: Thrombocytosis etiology unclear  Splenomegaly with elevated liver function enzymes, etiology unclear  No diagnosis found.    REASON FOR VISIT:   No chief complaint on file.       HEMATOLOGY / ONCOLOGY HISTORY: This is a 56-year-old white male in the usual state of health about a week ago he felt dizzy and lightheaded and getting and presented himself to the emergency room.  CT scans and MRIs of the head were without any abnormality.  Notable was elevated platelets at 998 as well as elevation of AST and ALT and an ultrasound of the spleen showed a spleen size of 13.5 cm with a normal size is 9 x 10 x 11.  Next    Patient is being referred to hematology for further recommendations, after he was placed on aspirin 81 mg the margins from.   No history exists.     [No treatment plan]  Cancer Staging Information:  No matching staging information was found for the patient.      INTERVAL HISTORY  Patient ID: Devonte Lara is a 56 y.o. year old male This is a 56-year-old white male in the usual state of health about a week ago he felt dizzy and lightheaded and getting and presented himself to the emergency room.  CT scans and MRIs of the head were without any abnormality.  Notable was elevated platelets at 998 as well as elevation of AST and ALT and an ultrasound of the spleen showed a spleen size of 13.5 cm with a normal size is 9 x 10 x 11.         Review of Systems   Constitutional: Negative.    HENT: Negative.    Eyes: Negative.    Respiratory: Negative.    Cardiovascular: Negative.    Gastrointestinal: Negative.    Endocrine: Negative.    Genitourinary: Negative.    Musculoskeletal: Negative.    Skin: Negative.    Allergic/Immunologic: Negative.    Neurological: Negative.    Hematological: Negative.    Psychiatric/Behavioral: Negative.             Medications:    Current Outpatient Prescriptions   Medication Sig Dispense Refill    • aspirin 81 MG chewable tablet Chew 81 mg Daily.     • hydroxyurea (HYDREA) 500 MG capsule One cap po tid 60 capsule 5   • valsartan (DIOVAN) 160 MG tablet        No current facility-administered medications for this visit.        ALLERGIES:  No Known Allergies    Objective      @VITALS    Current Status 3/6/2017   ECOG score 0       General Appearance: Patient is awake, alert, oriented and in no acute distress. Patient is welldeveloped, wellnourished, and appears stated age.  HEENT: Normocephalic. Sclerae clear, conjunctiva pink, extraocular movements intact, pupils, round, reactive to light and  accommodation. Mouth and throat are clear with moist oral mucosa.  NECK: Supple, no jugular venous distention, thyroid not enlarged.  LYMPH: No cervical, supraclavicular, axillary, or inguinal lymphadenopathy.  CHEST: Equal bilateral expansion, AP  diameter normal, resonant percussion note  LUNGS: Good air movement, no rales, rhonchi, rubs or wheezes with auscultation  CARDIO: Regular sinus rhythm, no murmurs, gallops or rubs.  ABDOMEN: Nondistended, soft, No tenderness, no guarding, no rebound, No hepatosplenomegaly. No abdominal masses. Bowel sounds positive. No hernia  GENITALIA: Not examined.  BREASTS: Not examined.  MUSKEL: No joint swelling, decreased motion, or inflammation  EXTREMS: No edema, clubbing, cyanosis, No varicose veins.  NEURO: Grossly nonfocal, Gait is coordinated and smooth, Cognition is preserved.  SKIN: No rashes, no ecchymoses, no petechia.  PSYCH: Oriented to time, place and person. Memory is preserved. Mood and affect appear normal      RECENT LABS:  No visits with results within 7 Day(s) from this visit.  Latest known visit with results is:    Lab on 03/06/2017   Component Date Value Ref Range Status   • Glucose 03/06/2017 101  75 - 110 mg/dL Final   • BUN 03/06/2017 17  9 - 21 mg/dL Final   • Creatinine 03/06/2017 1.00  0.80 - 1.50 mg/dL Final   • Sodium 03/06/2017 141  137 - 145 mmol/L Final    • Potassium 03/06/2017 4.1  3.6 - 5.0 mmol/L Final   • Chloride 03/06/2017 98  98 - 107 mmol/L Final   • CO2 03/06/2017 27.0  22.0 - 30.0 mmol/L Final   • Calcium 03/06/2017 9.2  8.4 - 10.2 mg/dL Final   • Total Protein 03/06/2017 7.7  6.3 - 8.2 g/dL Final   • Albumin 03/06/2017 4.60  3.50 - 5.00 g/dL Final   • ALT (SGPT) 03/06/2017 67  21 - 72 U/L Final   • AST (SGOT) 03/06/2017 57* 5 - 40 U/L Final   • Alkaline Phosphatase 03/06/2017 64  38 - 126 U/L Final   • Total Bilirubin 03/06/2017 0.9  0.2 - 1.3 mg/dL Final   • eGFR Non African Amer 03/06/2017 78  >60 mL/min/1.73 Final   • Globulin 03/06/2017 3.1  gm/dL Final   • A/G Ratio 03/06/2017 1.5  g/dL Final   • BUN/Creatinine Ratio 03/06/2017 17.0  7.0 - 25.0 Final   • Anion Gap 03/06/2017 16.0  mmol/L Final   • WBC 03/06/2017 9.10  4.80 - 10.80 10*3/mm3 Final   • RBC 03/06/2017 4.28* 4.70 - 6.10 10*6/mm3 Final   • Hemoglobin 03/06/2017 13.5* 14.0 - 18.0 g/dL Final   • Hematocrit 03/06/2017 40.2* 42.0 - 52.0 % Final   • MCV 03/06/2017 94.0  80.0 - 94.0 fL Final   • MCH 03/06/2017 31.5* 27.0 - 31.0 pg Final   • MCHC 03/06/2017 33.6  33.0 - 37.0 g/dL Final   • RDW 03/06/2017 17.3* 11.5 - 14.5 % Final   • MPV 03/06/2017 7.9  6.0 - 12.0 fL Final   • Platelets 03/06/2017 786* 130 - 400 10*3/mm3 Final   • Neutrophil % 03/06/2017 63.6  37.0 - 92.0 % Final   • Lymphocyte % 03/06/2017 28.6  10.0 - 58.5 % Final   • Auto Mixed Cells % 03/06/2017 7.8  0.2 - 15.1 % Final   • Neutrophils, Absolute 03/06/2017 5.80  2.00 - 7.80 10*3/mm3 Final   • Lymphocytes, Absolute 03/06/2017 2.60  0.80 - 7.00 10*3/mm3 Final   • Auto Mixed Cells # 03/06/2017 0.70  0.10 - 1.50 10*3/uL Final       RADIOLOGY:  No results found.         Assessment/Plan     Essential thrombocytosis with CLAR gene positive.  Currently on 1000 mg of hydroxyurea daily, platelet count down to 414,000.  The goal is to bring the platelet count less than 500,000.  Therefore I would advised him to increase the dose of  hydroxyurea to 1500 mg daily and continue one baby aspirin every day and recheck a CBC in 1 month's time.  Patient doing great see him back in 3 months continue current dose of Hydrea.                Jonah Dai MD    4/6/2017    3:11 PM

## 2017-09-05 RX ORDER — HYDROXYUREA 500 MG/1
CAPSULE ORAL
Qty: 60 CAPSULE | Refills: 0 | Status: SHIPPED | OUTPATIENT
Start: 2017-09-05 | End: 2017-09-23 | Stop reason: SDUPTHER

## 2017-09-08 DIAGNOSIS — D47.3 THROMBOCYTHEMIA, ESSENTIAL (HCC): Primary | ICD-10-CM

## 2017-09-20 ENCOUNTER — OFFICE VISIT (OUTPATIENT)
Dept: ONCOLOGY | Facility: CLINIC | Age: 57
End: 2017-09-20

## 2017-09-20 ENCOUNTER — LAB (OUTPATIENT)
Dept: LAB | Facility: HOSPITAL | Age: 57
End: 2017-09-20

## 2017-09-20 VITALS
DIASTOLIC BLOOD PRESSURE: 84 MMHG | BODY MASS INDEX: 34.52 KG/M2 | WEIGHT: 254.9 LBS | SYSTOLIC BLOOD PRESSURE: 154 MMHG | HEIGHT: 72 IN | OXYGEN SATURATION: 98 % | RESPIRATION RATE: 18 BRPM | HEART RATE: 98 BPM | TEMPERATURE: 98.4 F

## 2017-09-20 DIAGNOSIS — D75.839 THROMBOCYTHEMIA: Primary | ICD-10-CM

## 2017-09-20 DIAGNOSIS — D47.3 THROMBOCYTHEMIA, ESSENTIAL (HCC): ICD-10-CM

## 2017-09-20 DIAGNOSIS — D47.3 ESSENTIAL THROMBOCYTOSIS (HCC): Primary | ICD-10-CM

## 2017-09-20 LAB
ALBUMIN SERPL-MCNC: 4.5 G/DL (ref 3.5–5)
ALBUMIN/GLOB SERPL: 1.6 G/DL (ref 1.1–2.5)
ALP SERPL-CCNC: 72 U/L (ref 24–120)
ALT SERPL W P-5'-P-CCNC: 57 U/L (ref 0–54)
ANION GAP SERPL CALCULATED.3IONS-SCNC: 13 MMOL/L (ref 4–13)
AST SERPL-CCNC: 51 U/L (ref 7–45)
AUTO MIXED CELLS #: 0.7 10*3/MM3 (ref 0.1–2.6)
AUTO MIXED CELLS %: 12.7 % (ref 0.1–24)
BILIRUB SERPL-MCNC: 1 MG/DL (ref 0.1–1)
BUN BLD-MCNC: 14 MG/DL (ref 5–21)
BUN/CREAT SERPL: 15.4
CALCIUM SPEC-SCNC: 9.3 MG/DL (ref 8.4–10.4)
CHLORIDE SERPL-SCNC: 101 MMOL/L (ref 98–110)
CO2 SERPL-SCNC: 24 MMOL/L (ref 24–31)
CREAT BLD-MCNC: 0.91 MG/DL (ref 0.5–1.4)
ERYTHROCYTE [DISTWIDTH] IN BLOOD BY AUTOMATED COUNT: 14.4 % (ref 12–15)
GFR SERPL CREATININE-BSD FRML MDRD: 87 ML/MIN/1.73
GLOBULIN UR ELPH-MCNC: 2.9 GM/DL
GLUCOSE BLD-MCNC: 89 MG/DL (ref 70–100)
HCT VFR BLD AUTO: 36.4 % (ref 40–52)
HGB BLD-MCNC: 13.5 G/DL (ref 14–18)
HOLD SPECIMEN: NORMAL
LYMPHOCYTES # BLD AUTO: 2.4 10*3/MM3 (ref 0.8–7)
LYMPHOCYTES NFR BLD AUTO: 42 % (ref 15–45)
MCH RBC QN AUTO: 39.5 PG (ref 28–32)
MCHC RBC AUTO-ENTMCNC: 37.1 G/DL (ref 33–36)
MCV RBC AUTO: 106.4 FL (ref 82–95)
NEUTROPHILS # BLD AUTO: 2.6 10*3/MM3 (ref 1.5–8.3)
NEUTROPHILS NFR BLD AUTO: 45.3 % (ref 39–78)
PLATELET # BLD AUTO: 397 10*3/MM3 (ref 130–400)
PMV BLD AUTO: 8 FL (ref 6–12)
POTASSIUM BLD-SCNC: 3.8 MMOL/L (ref 3.5–5.3)
PROT SERPL-MCNC: 7.4 G/DL (ref 6.3–8.7)
RBC # BLD AUTO: 3.42 10*6/MM3 (ref 4.2–5.4)
SODIUM BLD-SCNC: 138 MMOL/L (ref 135–145)
WBC NRBC COR # BLD: 5.7 10*3/MM3 (ref 4.8–10.8)

## 2017-09-20 PROCEDURE — 99214 OFFICE O/P EST MOD 30 MIN: CPT | Performed by: INTERNAL MEDICINE

## 2017-09-20 PROCEDURE — 85025 COMPLETE CBC W/AUTO DIFF WBC: CPT

## 2017-09-20 PROCEDURE — 36415 COLL VENOUS BLD VENIPUNCTURE: CPT

## 2017-09-20 PROCEDURE — 80053 COMPREHEN METABOLIC PANEL: CPT

## 2017-09-20 NOTE — PROGRESS NOTES
[facility]  HEMATOLOGY & ONCOLOGY        Subjective     VISIT DIAGNOSIS: Thrombocytosis etiology unclear  Splenomegaly with elevated liver function enzymes, etiology unclear  Encounter Diagnosis   Name Primary?   • Thrombocythemia Yes       REASON FOR VISIT:     Chief Complaint   Patient presents with   • Follow-up     Thrombocytopenia f/u:  He is here for f/u visit today and to review his lab work.         HEMATOLOGY / ONCOLOGY HISTORY: This is a 56-year-old white male in the usual state of health about a week ago he felt dizzy and lightheaded and getting and presented himself to the emergency room.  CT scans and MRIs of the head were without any abnormality.  Notable was elevated platelets at 998 as well as elevation of AST and ALT and an ultrasound of the spleen showed a spleen size of 13.5 cm with a normal size is 9 x 10 x 11.  Next    Patient is being referred to hematology for further recommendations, after he was placed on aspirin 81 mg the margins from.   No history exists.     [No treatment plan]  Cancer Staging Information:  No matching staging information was found for the patient.      INTERVAL HISTORY  Patient ID: Devonte Lara is a 56 y.o. year old male This is a 56-year-old white male in the usual state of health about a week ago he felt dizzy and lightheaded and getting and presented himself to the emergency room.  CT scans and MRIs of the head were without any abnormality.  Notable was elevated platelets at 998 as well as elevation of AST and ALT and an ultrasound of the spleen showed a spleen size of 13.5 cm with a normal size is 9 x 10 x 11.         Review of Systems   Constitutional: Negative.    HENT: Negative.    Eyes: Negative.    Respiratory: Negative.    Cardiovascular: Negative.    Gastrointestinal: Negative.    Endocrine: Negative.    Genitourinary: Negative.    Musculoskeletal: Negative.    Skin: Negative.    Allergic/Immunologic: Negative.    Neurological: Negative.     Hematological: Negative.    Psychiatric/Behavioral: Negative.             Medications:    Current Outpatient Prescriptions   Medication Sig Dispense Refill   • aspirin 81 MG chewable tablet Chew 81 mg Daily.     • hydroxyurea (HYDREA) 500 MG capsule TAKE ONE CAPSULE BY MOUTH THREE TIMES A DAY 60 capsule 0   • valsartan (DIOVAN) 160 MG tablet        No current facility-administered medications for this visit.        ALLERGIES:  No Known Allergies    Objective      @VITALS    Current Status 9/20/2017   ECOG score 0       General Appearance: Patient is awake, alert, oriented and in no acute distress. Patient is welldeveloped, wellnourished, and appears stated age.  HEENT: Normocephalic. Sclerae clear, conjunctiva pink, extraocular movements intact, pupils, round, reactive to light and  accommodation. Mouth and throat are clear with moist oral mucosa.  NECK: Supple, no jugular venous distention, thyroid not enlarged.  LYMPH: No cervical, supraclavicular, axillary, or inguinal lymphadenopathy.  CHEST: Equal bilateral expansion, AP  diameter normal, resonant percussion note  LUNGS: Good air movement, no rales, rhonchi, rubs or wheezes with auscultation  CARDIO: Regular sinus rhythm, no murmurs, gallops or rubs.  ABDOMEN: Nondistended, soft, No tenderness, no guarding, no rebound, No hepatosplenomegaly. No abdominal masses. Bowel sounds positive. No hernia  GENITALIA: Not examined.  BREASTS: Not examined.  MUSKEL: No joint swelling, decreased motion, or inflammation  EXTREMS: No edema, clubbing, cyanosis, No varicose veins.  NEURO: Grossly nonfocal, Gait is coordinated and smooth, Cognition is preserved.  SKIN: No rashes, no ecchymoses, no petechia.  PSYCH: Oriented to time, place and person. Memory is preserved. Mood and affect appear normal      RECENT LABS:  Lab on 09/20/2017   Component Date Value Ref Range Status   • Glucose 09/20/2017 89  70 - 100 mg/dL Final   • BUN 09/20/2017 14  5 - 21 mg/dL Final   • Creatinine  09/20/2017 0.91  0.50 - 1.40 mg/dL Final   • Sodium 09/20/2017 138  135 - 145 mmol/L Final   • Potassium 09/20/2017 3.8  3.5 - 5.3 mmol/L Final   • Chloride 09/20/2017 101  98 - 110 mmol/L Final   • CO2 09/20/2017 24.0  24.0 - 31.0 mmol/L Final   • Calcium 09/20/2017 9.3  8.4 - 10.4 mg/dL Final   • Total Protein 09/20/2017 7.4  6.3 - 8.7 g/dL Final   • Albumin 09/20/2017 4.50  3.50 - 5.00 g/dL Final   • ALT (SGPT) 09/20/2017 57* 0 - 54 U/L Final   • AST (SGOT) 09/20/2017 51* 7 - 45 U/L Final   • Alkaline Phosphatase 09/20/2017 72  24 - 120 U/L Final   • Total Bilirubin 09/20/2017 1.0  0.1 - 1.0 mg/dL Final   • eGFR Non  Amer 09/20/2017 87  >60 mL/min/1.73 Final   • Globulin 09/20/2017 2.9  gm/dL Final   • A/G Ratio 09/20/2017 1.6  1.1 - 2.5 g/dL Final   • BUN/Creatinine Ratio 09/20/2017 15.4    Final   • Anion Gap 09/20/2017 13.0  4.0 - 13.0 mmol/L Final   • WBC 09/20/2017 5.70  4.80 - 10.80 10*3/mm3 Final   • RBC 09/20/2017 3.42* 4.20 - 5.40 10*6/mm3 Final   • Hemoglobin 09/20/2017 13.5* 14.0 - 18.0 g/dL Final   • Hematocrit 09/20/2017 36.4* 40.0 - 52.0 % Final   • MCV 09/20/2017 106.4* 82.0 - 95.0 fL Final   • MCH 09/20/2017 39.5* 28.0 - 32.0 pg Final   • MCHC 09/20/2017 37.1* 33.0 - 36.0 g/dL Final   • RDW 09/20/2017 14.4  12.0 - 15.0 % Final   • MPV 09/20/2017 8.0  6.0 - 12.0 fL Final   • Platelets 09/20/2017 397  130 - 400 10*3/mm3 Final   • Neutrophil % 09/20/2017 45.3  39.0 - 78.0 % Final   • Lymphocyte % 09/20/2017 42.0  15.0 - 45.0 % Final   • Auto Mixed Cells % 09/20/2017 12.7  0.1 - 24.0 % Final   • Neutrophils, Absolute 09/20/2017 2.60  1.50 - 8.30 10*3/mm3 Final   • Lymphocytes, Absolute 09/20/2017 2.40  0.80 - 7.00 10*3/mm3 Final   • Auto Mixed Cells # 09/20/2017 0.70  0.10 - 2.60 10*3/mm3 Final       RADIOLOGY:  No results found.         Assessment/Plan     Essential thrombocytosis with CLAR gene positive.  Currently on 1000 mg of hydroxyurea daily, platelet count down to 397,000.  The goal is  to bring the platelet count less than 500,000.  Therefore I would advised him to increase the dose of hydroxyurea to 1500 mg daily and continue one baby aspirin every day and recheck a CBC in 1 month's time.  Patient doing great see him back in 3 months continue current dose of Hydrea. Recc to loose wt.                Jonah Dai MD    9/20/2017    4:27 PM

## 2017-09-26 RX ORDER — HYDROXYUREA 500 MG/1
CAPSULE ORAL
Qty: 90 CAPSULE | Refills: 3 | Status: SHIPPED | OUTPATIENT
Start: 2017-09-26 | End: 2018-03-20

## 2017-12-15 DIAGNOSIS — D47.3 THROMBOCYTHEMIA, ESSENTIAL (HCC): Primary | ICD-10-CM

## 2017-12-20 ENCOUNTER — APPOINTMENT (OUTPATIENT)
Dept: LAB | Facility: HOSPITAL | Age: 57
End: 2017-12-20

## 2018-01-03 ENCOUNTER — OFFICE VISIT (OUTPATIENT)
Dept: ONCOLOGY | Facility: CLINIC | Age: 58
End: 2018-01-03

## 2018-01-03 ENCOUNTER — LAB (OUTPATIENT)
Dept: LAB | Facility: HOSPITAL | Age: 58
End: 2018-01-03

## 2018-01-03 VITALS
TEMPERATURE: 98.2 F | RESPIRATION RATE: 18 BRPM | SYSTOLIC BLOOD PRESSURE: 158 MMHG | DIASTOLIC BLOOD PRESSURE: 88 MMHG | HEIGHT: 72 IN | HEART RATE: 86 BPM | BODY MASS INDEX: 34.8 KG/M2 | OXYGEN SATURATION: 99 % | WEIGHT: 256.9 LBS

## 2018-01-03 DIAGNOSIS — D47.3 THROMBOCYTHEMIA, ESSENTIAL (HCC): Primary | ICD-10-CM

## 2018-01-03 DIAGNOSIS — D47.3 ESSENTIAL THROMBOCYTOSIS (HCC): Primary | ICD-10-CM

## 2018-01-03 DIAGNOSIS — D75.839 THROMBOCYTHEMIA: Primary | ICD-10-CM

## 2018-01-03 LAB
ALBUMIN SERPL-MCNC: 4.3 G/DL (ref 3.5–5)
ALBUMIN/GLOB SERPL: 1.5 G/DL (ref 1.1–2.5)
ALP SERPL-CCNC: 87 U/L (ref 24–120)
ALT SERPL W P-5'-P-CCNC: 128 U/L (ref 0–54)
ANION GAP SERPL CALCULATED.3IONS-SCNC: 13 MMOL/L (ref 4–13)
AST SERPL-CCNC: 86 U/L (ref 7–45)
AUTO MIXED CELLS #: 0.5 10*3/MM3 (ref 0.1–2.6)
AUTO MIXED CELLS %: 9.9 % (ref 0.1–24)
BILIRUB SERPL-MCNC: 0.8 MG/DL (ref 0.1–1)
BUN BLD-MCNC: 11 MG/DL (ref 5–21)
BUN/CREAT SERPL: 12.8 (ref 7–25)
CALCIUM SPEC-SCNC: 9.1 MG/DL (ref 8.4–10.4)
CHLORIDE SERPL-SCNC: 102 MMOL/L (ref 98–110)
CO2 SERPL-SCNC: 25 MMOL/L (ref 24–31)
CREAT BLD-MCNC: 0.86 MG/DL (ref 0.5–1.4)
ERYTHROCYTE [DISTWIDTH] IN BLOOD BY AUTOMATED COUNT: 14.2 % (ref 12–15)
FERRITIN SERPL-MCNC: 504 NG/ML (ref 17.9–464)
GFR SERPL CREATININE-BSD FRML MDRD: 92 ML/MIN/1.73
GLOBULIN UR ELPH-MCNC: 2.9 GM/DL
GLUCOSE BLD-MCNC: 100 MG/DL (ref 70–100)
HCT VFR BLD AUTO: 34.9 % (ref 40–52)
HGB BLD-MCNC: 13.3 G/DL (ref 14–18)
HOLD SPECIMEN: NORMAL
IRON 24H UR-MRATE: 76 MCG/DL (ref 42–180)
IRON SATN MFR SERPL: 23 % (ref 20–45)
LYMPHOCYTES # BLD AUTO: 2.1 10*3/MM3 (ref 0.8–7)
LYMPHOCYTES NFR BLD AUTO: 44.8 % (ref 15–45)
MCH RBC QN AUTO: 39.2 PG (ref 28–32)
MCHC RBC AUTO-ENTMCNC: 38.1 G/DL (ref 33–36)
MCV RBC AUTO: 102.9 FL (ref 82–95)
NEUTROPHILS # BLD AUTO: 2.1 10*3/MM3 (ref 1.5–8.3)
NEUTROPHILS NFR BLD AUTO: 45.3 % (ref 39–78)
PLATELET # BLD AUTO: 317 10*3/MM3 (ref 130–400)
PMV BLD AUTO: 8.3 FL (ref 6–12)
POTASSIUM BLD-SCNC: 3.8 MMOL/L (ref 3.5–5.3)
PROT SERPL-MCNC: 7.2 G/DL (ref 6.3–8.7)
RBC # BLD AUTO: 3.39 10*6/MM3 (ref 4.2–5.4)
SODIUM BLD-SCNC: 140 MMOL/L (ref 135–145)
TIBC SERPL-MCNC: 325 MCG/DL (ref 225–420)
WBC NRBC COR # BLD: 4.7 10*3/MM3 (ref 4.8–10.8)

## 2018-01-03 PROCEDURE — 83550 IRON BINDING TEST: CPT | Performed by: INTERNAL MEDICINE

## 2018-01-03 PROCEDURE — 99214 OFFICE O/P EST MOD 30 MIN: CPT | Performed by: INTERNAL MEDICINE

## 2018-01-03 PROCEDURE — 83540 ASSAY OF IRON: CPT | Performed by: INTERNAL MEDICINE

## 2018-01-03 PROCEDURE — 85025 COMPLETE CBC W/AUTO DIFF WBC: CPT | Performed by: INTERNAL MEDICINE

## 2018-01-03 PROCEDURE — 36415 COLL VENOUS BLD VENIPUNCTURE: CPT

## 2018-01-03 PROCEDURE — 80053 COMPREHEN METABOLIC PANEL: CPT | Performed by: INTERNAL MEDICINE

## 2018-01-03 PROCEDURE — 82728 ASSAY OF FERRITIN: CPT | Performed by: INTERNAL MEDICINE

## 2018-01-03 RX ORDER — ANAGRELIDE 1 MG/1
1 CAPSULE ORAL
Qty: 60 CAPSULE | Refills: 3 | Status: SHIPPED | OUTPATIENT
Start: 2018-01-03 | End: 2018-03-05 | Stop reason: SDUPTHER

## 2018-01-03 RX ORDER — PHENTERMINE HYDROCHLORIDE 37.5 MG/1
37.5 TABLET ORAL
Qty: 30 TABLET | Refills: 0 | Status: SHIPPED | OUTPATIENT
Start: 2018-01-03 | End: 2018-01-31 | Stop reason: SDUPTHER

## 2018-01-03 NOTE — PROGRESS NOTES
[facility]  HEMATOLOGY & ONCOLOGY        Subjective     VISIT DIAGNOSIS: Thrombocytosis etiology unclear  Splenomegaly with elevated liver function enzymes, etiology unclear  Encounter Diagnosis   Name Primary?   • Thrombocythemia Yes       REASON FOR VISIT:     No chief complaint on file.       HEMATOLOGY / ONCOLOGY HISTORY: This is a 56-year-old white male in the usual state of health about a week ago he felt dizzy and lightheaded and getting and presented himself to the emergency room.  CT scans and MRIs of the head were without any abnormality.  Notable was elevated platelets at 998 as well as elevation of AST and ALT and an ultrasound of the spleen showed a spleen size of 13.5 cm with a normal size is 9 x 10 x 11.  Next    Patient is being referred to hematology for further recommendations, after he was placed on aspirin 81 mg the margins from.   No history exists.     [No treatment plan]  Cancer Staging Information:  No matching staging information was found for the patient.      INTERVAL HISTORY  Patient ID: Deovnte Lara is a 57 y.o. year old male This is a 56-year-old white male in the usual state of health about a week ago he felt dizzy and lightheaded and getting and presented himself to the emergency room.  CT scans and MRIs of the head were without any abnormality.  Notable was elevated platelets at 998 as well as elevation of AST and ALT and an ultrasound of the spleen showed a spleen size of 13.5 cm with a normal size is 9 x 10 x 11.         Review of Systems   Constitutional: Negative.    HENT: Negative.    Eyes: Negative.    Respiratory: Negative.    Cardiovascular: Negative.    Gastrointestinal: Negative.    Endocrine: Negative.    Genitourinary: Negative.    Musculoskeletal: Negative.    Skin: Negative.    Allergic/Immunologic: Negative.    Neurological: Negative.    Hematological: Negative.    Psychiatric/Behavioral: Negative.             Medications:    Current Outpatient  Prescriptions   Medication Sig Dispense Refill   • aspirin 81 MG chewable tablet Chew 81 mg Daily.     • hydroxyurea (HYDREA) 500 MG capsule TAKE ONE CAPSULE BY MOUTH THREE TIMES A DAY 90 capsule 3   • valsartan (DIOVAN) 160 MG tablet        No current facility-administered medications for this visit.        ALLERGIES:  No Known Allergies    Objective      @VITALS    Current Status 9/20/2017   ECOG score 0       General Appearance: Patient is awake, alert, oriented and in no acute distress. Patient is welldeveloped, wellnourished, and appears stated age.  HEENT: Normocephalic. Sclerae clear, conjunctiva pink, extraocular movements intact, pupils, round, reactive to light and  accommodation. Mouth and throat are clear with moist oral mucosa.  NECK: Supple, no jugular venous distention, thyroid not enlarged.  LYMPH: No cervical, supraclavicular, axillary, or inguinal lymphadenopathy.  CHEST: Equal bilateral expansion, AP  diameter normal, resonant percussion note  LUNGS: Good air movement, no rales, rhonchi, rubs or wheezes with auscultation  CARDIO: Regular sinus rhythm, no murmurs, gallops or rubs.  ABDOMEN: Nondistended, soft, No tenderness, no guarding, no rebound, No hepatosplenomegaly. No abdominal masses. Bowel sounds positive. No hernia  GENITALIA: Not examined.  BREASTS: Not examined.  MUSKEL: No joint swelling, decreased motion, or inflammation  EXTREMS: No edema, clubbing, cyanosis, No varicose veins.  NEURO: Grossly nonfocal, Gait is coordinated and smooth, Cognition is preserved.  SKIN: No rashes, no ecchymoses, no petechia.  PSYCH: Oriented to time, place and person. Memory is preserved. Mood and affect appear normal      RECENT LABS:  No visits with results within 7 Day(s) from this visit.  Latest known visit with results is:    Lab on 09/20/2017   Component Date Value Ref Range Status   • Glucose 09/20/2017 89  70 - 100 mg/dL Final   • BUN 09/20/2017 14  5 - 21 mg/dL Final   • Creatinine 09/20/2017  0.91  0.50 - 1.40 mg/dL Final   • Sodium 09/20/2017 138  135 - 145 mmol/L Final   • Potassium 09/20/2017 3.8  3.5 - 5.3 mmol/L Final   • Chloride 09/20/2017 101  98 - 110 mmol/L Final   • CO2 09/20/2017 24.0  24.0 - 31.0 mmol/L Final   • Calcium 09/20/2017 9.3  8.4 - 10.4 mg/dL Final   • Total Protein 09/20/2017 7.4  6.3 - 8.7 g/dL Final   • Albumin 09/20/2017 4.50  3.50 - 5.00 g/dL Final   • ALT (SGPT) 09/20/2017 57* 0 - 54 U/L Final   • AST (SGOT) 09/20/2017 51* 7 - 45 U/L Final   • Alkaline Phosphatase 09/20/2017 72  24 - 120 U/L Final   • Total Bilirubin 09/20/2017 1.0  0.1 - 1.0 mg/dL Final   • eGFR Non  Amer 09/20/2017 87  >60 mL/min/1.73 Final   • Globulin 09/20/2017 2.9  gm/dL Final   • A/G Ratio 09/20/2017 1.6  1.1 - 2.5 g/dL Final   • BUN/Creatinine Ratio 09/20/2017 15.4    Final   • Anion Gap 09/20/2017 13.0  4.0 - 13.0 mmol/L Final   • WBC 09/20/2017 5.70  4.80 - 10.80 10*3/mm3 Final   • RBC 09/20/2017 3.42* 4.20 - 5.40 10*6/mm3 Final   • Hemoglobin 09/20/2017 13.5* 14.0 - 18.0 g/dL Final   • Hematocrit 09/20/2017 36.4* 40.0 - 52.0 % Final   • MCV 09/20/2017 106.4* 82.0 - 95.0 fL Final   • MCH 09/20/2017 39.5* 28.0 - 32.0 pg Final   • MCHC 09/20/2017 37.1* 33.0 - 36.0 g/dL Final   • RDW 09/20/2017 14.4  12.0 - 15.0 % Final   • MPV 09/20/2017 8.0  6.0 - 12.0 fL Final   • Platelets 09/20/2017 397  130 - 400 10*3/mm3 Final   • Neutrophil % 09/20/2017 45.3  39.0 - 78.0 % Final   • Lymphocyte % 09/20/2017 42.0  15.0 - 45.0 % Final   • Auto Mixed Cells % 09/20/2017 12.7  0.1 - 24.0 % Final   • Neutrophils, Absolute 09/20/2017 2.60  1.50 - 8.30 10*3/mm3 Final   • Lymphocytes, Absolute 09/20/2017 2.40  0.80 - 7.00 10*3/mm3 Final   • Auto Mixed Cells # 09/20/2017 0.70  0.10 - 2.60 10*3/mm3 Final   • Extra Tube 09/20/2017 Hold for add-ons.   Final    Auto resulted.       RADIOLOGY:  No results found.         Assessment/Plan     Essential thrombocytosis with CLAR gene positive.  Currently on 1000 mg of  hydroxyurea daily, platelet count down to 397,000.  The goal is to bring the platelet count less than 500,000.  Therefore I would advised him to increase the dose of hydroxyurea to 1500 mg daily and continue one baby aspirin every day and recheck a CBC in 1 month's time.    He has been on Hydrea for last 1 yr and it has kept his Plts well under control. However, Hydrea is an Alkylator and I fear giving the Alkylator for too long, that can cause additional damage to the BM. Therefore, this year, will stop Hydrea and try Anagleride1 mg bid. Goal is to keep Plts less that 500K. May need to adjust the dose of Anagleride, which is a plt targeting agent only, during next visit.  RTC 3 months.               Jonah Dai MD    1/3/2018    3:49 PM

## 2018-02-05 RX ORDER — PHENTERMINE HYDROCHLORIDE 37.5 MG/1
TABLET ORAL
Qty: 30 TABLET | Refills: 0 | Status: SHIPPED | OUTPATIENT
Start: 2018-02-05 | End: 2018-03-05 | Stop reason: SDUPTHER

## 2018-02-27 DIAGNOSIS — D47.3 THROMBOCYTHEMIA, ESSENTIAL (HCC): Primary | ICD-10-CM

## 2018-03-05 ENCOUNTER — LAB (OUTPATIENT)
Dept: LAB | Facility: HOSPITAL | Age: 58
End: 2018-03-05

## 2018-03-05 ENCOUNTER — OFFICE VISIT (OUTPATIENT)
Dept: ONCOLOGY | Facility: CLINIC | Age: 58
End: 2018-03-05

## 2018-03-05 VITALS
OXYGEN SATURATION: 96 % | HEART RATE: 104 BPM | TEMPERATURE: 97.4 F | RESPIRATION RATE: 18 BRPM | HEIGHT: 72 IN | WEIGHT: 249 LBS | SYSTOLIC BLOOD PRESSURE: 124 MMHG | BODY MASS INDEX: 33.72 KG/M2 | DIASTOLIC BLOOD PRESSURE: 74 MMHG

## 2018-03-05 DIAGNOSIS — D47.3 THROMBOCYTHEMIA, ESSENTIAL (HCC): Primary | ICD-10-CM

## 2018-03-05 DIAGNOSIS — D75.839 THROMBOCYTHEMIA: Primary | ICD-10-CM

## 2018-03-05 LAB
ALBUMIN SERPL-MCNC: 4.3 G/DL (ref 3.5–5)
ALBUMIN/GLOB SERPL: 1.6 G/DL (ref 1.1–2.5)
ALP SERPL-CCNC: 70 U/L (ref 24–120)
ALT SERPL W P-5'-P-CCNC: 40 U/L (ref 0–54)
ANION GAP SERPL CALCULATED.3IONS-SCNC: 10 MMOL/L (ref 4–13)
AST SERPL-CCNC: 44 U/L (ref 7–45)
BASOPHILS # BLD AUTO: 0.09 10*3/MM3 (ref 0–0.2)
BASOPHILS NFR BLD AUTO: 1.2 % (ref 0–2)
BILIRUB SERPL-MCNC: 0.6 MG/DL (ref 0.1–1)
BUN BLD-MCNC: 16 MG/DL (ref 5–21)
BUN/CREAT SERPL: 16.3 (ref 7–25)
CALCIUM SPEC-SCNC: 9.4 MG/DL (ref 8.4–10.4)
CHLORIDE SERPL-SCNC: 102 MMOL/L (ref 98–110)
CO2 SERPL-SCNC: 28 MMOL/L (ref 24–31)
CREAT BLD-MCNC: 0.98 MG/DL (ref 0.5–1.4)
DEPRECATED RDW RBC AUTO: 43.7 FL (ref 40–54)
EOSINOPHIL # BLD AUTO: 0.11 10*3/MM3 (ref 0–0.7)
EOSINOPHIL NFR BLD AUTO: 1.5 % (ref 0–4)
ERYTHROCYTE [DISTWIDTH] IN BLOOD BY AUTOMATED COUNT: 12.4 % (ref 12–15)
GFR SERPL CREATININE-BSD FRML MDRD: 79 ML/MIN/1.73
GLOBULIN UR ELPH-MCNC: 2.7 GM/DL
GLUCOSE BLD-MCNC: 108 MG/DL (ref 70–100)
HCT VFR BLD AUTO: 38.8 % (ref 40–52)
HGB BLD-MCNC: 13.7 G/DL (ref 14–18)
HOLD SPECIMEN: NORMAL
IMM GRANULOCYTES # BLD: 0.05 10*3/MM3 (ref 0–0.03)
IMM GRANULOCYTES NFR BLD: 0.7 % (ref 0–5)
LYMPHOCYTES # BLD AUTO: 2.35 10*3/MM3 (ref 0.72–4.86)
LYMPHOCYTES NFR BLD AUTO: 32 % (ref 15–45)
MCH RBC QN AUTO: 33.7 PG (ref 28–32)
MCHC RBC AUTO-ENTMCNC: 35.3 G/DL (ref 33–36)
MCV RBC AUTO: 95.6 FL (ref 82–95)
MONOCYTES # BLD AUTO: 0.65 10*3/MM3 (ref 0.19–1.3)
MONOCYTES NFR BLD AUTO: 8.8 % (ref 4–12)
NEUTROPHILS # BLD AUTO: 4.1 10*3/MM3 (ref 1.87–8.4)
NEUTROPHILS NFR BLD AUTO: 55.8 % (ref 39–78)
NRBC BLD MANUAL-RTO: 0 /100 WBC (ref 0–0)
PLATELET # BLD AUTO: 829 10*3/MM3 (ref 130–400)
PMV BLD AUTO: 8.6 FL (ref 6–12)
POTASSIUM BLD-SCNC: 4 MMOL/L (ref 3.5–5.3)
PROT SERPL-MCNC: 7 G/DL (ref 6.3–8.7)
RBC # BLD AUTO: 4.06 10*6/MM3 (ref 4.8–5.9)
SODIUM BLD-SCNC: 140 MMOL/L (ref 135–145)
WBC NRBC COR # BLD: 7.35 10*3/MM3 (ref 4.8–10.8)

## 2018-03-05 PROCEDURE — 80053 COMPREHEN METABOLIC PANEL: CPT | Performed by: INTERNAL MEDICINE

## 2018-03-05 PROCEDURE — 36415 COLL VENOUS BLD VENIPUNCTURE: CPT

## 2018-03-05 PROCEDURE — 85025 COMPLETE CBC W/AUTO DIFF WBC: CPT | Performed by: INTERNAL MEDICINE

## 2018-03-05 PROCEDURE — 99214 OFFICE O/P EST MOD 30 MIN: CPT | Performed by: INTERNAL MEDICINE

## 2018-03-05 RX ORDER — PHENTERMINE HYDROCHLORIDE 37.5 MG/1
37.5 TABLET ORAL DAILY
Qty: 30 TABLET | Refills: 0 | Status: SHIPPED | OUTPATIENT
Start: 2018-03-05 | End: 2018-03-20

## 2018-03-05 RX ORDER — ANAGRELIDE 1 MG/1
CAPSULE ORAL
Qty: 120 CAPSULE | Refills: 3 | Status: SHIPPED | OUTPATIENT
Start: 2018-03-05 | End: 2018-04-25

## 2018-03-05 NOTE — TELEPHONE ENCOUNTER
Dose increased per Dr Dai instructions 2 tabs po (2 mg) am and 2 tabs po (2 mg) nite, new script sent to patients preferred pharmacy. Patient to  medication.

## 2018-03-05 NOTE — PROGRESS NOTES
[facility]  HEMATOLOGY & ONCOLOGY        Subjective     VISIT DIAGNOSIS: Thrombocytosis etiology unclear  Splenomegaly with elevated liver function enzymes, etiology unclear  Encounter Diagnosis   Name Primary?   • Thrombocythemia Yes       REASON FOR VISIT:     Chief Complaint   Patient presents with   • Follow-up     He is here for f/u visit today        HEMATOLOGY / ONCOLOGY HISTORY: This is a 56-year-old white male in the usual state of health about a week ago he felt dizzy and lightheaded and getting and presented himself to the emergency room.  CT scans and MRIs of the head were without any abnormality.  Notable was elevated platelets at 998 as well as elevation of AST and ALT and an ultrasound of the spleen showed a spleen size of 13.5 cm with a normal size is 9 x 10 x 11.  Next    Patient is being referred to hematology for further recommendations, after he was placed on aspirin 81 mg the margins from.   No history exists.     [No treatment plan]  Cancer Staging Information:  No matching staging information was found for the patient.      INTERVAL HISTORY  Patient ID: Devonte Lara is a 57 y.o. year old male This is a 56-year-old white male in the usual state of health about a week ago he felt dizzy and lightheaded and getting and presented himself to the emergency room.  CT scans and MRIs of the head were without any abnormality.  Notable was elevated platelets at 998 as well as elevation of AST and ALT and an ultrasound of the spleen showed a spleen size of 13.5 cm with a normal size is 9 x 10 x 11.         Review of Systems   Constitutional: Negative.    HENT: Negative.    Eyes: Negative.    Respiratory: Negative.    Cardiovascular: Negative.    Gastrointestinal: Negative.    Endocrine: Negative.    Genitourinary: Negative.    Musculoskeletal: Negative.    Skin: Negative.    Allergic/Immunologic: Negative.    Neurological: Negative.    Hematological: Negative.    Psychiatric/Behavioral:  Negative.             Medications:    Current Outpatient Prescriptions   Medication Sig Dispense Refill   • anagrelide (AGRYLIN) 1 MG capsule Take 1 capsule by mouth 2 (Two) Times a Day. 60 capsule 3   • aspirin 81 MG chewable tablet Chew 81 mg Daily.     • phentermine (ADIPEX-P) 37.5 MG tablet TAKE ONE TABLET BY MOUTH EVERY MORNING BEFORE BREAKFAST 30 tablet 0   • valsartan (DIOVAN) 160 MG tablet      • hydroxyurea (HYDREA) 500 MG capsule TAKE ONE CAPSULE BY MOUTH THREE TIMES A DAY 90 capsule 3     No current facility-administered medications for this visit.        ALLERGIES:  No Known Allergies    Objective      @VITALS    Current Status 3/5/2018   ECOG score 0       General Appearance: Patient is awake, alert, oriented and in no acute distress. Patient is welldeveloped, wellnourished, and appears stated age.  HEENT: Normocephalic. Sclerae clear, conjunctiva pink, extraocular movements intact, pupils, round, reactive to light and  accommodation. Mouth and throat are clear with moist oral mucosa.  NECK: Supple, no jugular venous distention, thyroid not enlarged.  LYMPH: No cervical, supraclavicular, axillary, or inguinal lymphadenopathy.  CHEST: Equal bilateral expansion, AP  diameter normal, resonant percussion note  LUNGS: Good air movement, no rales, rhonchi, rubs or wheezes with auscultation  CARDIO: Regular sinus rhythm, no murmurs, gallops or rubs.  ABDOMEN: Nondistended, soft, No tenderness, no guarding, no rebound, No hepatosplenomegaly. No abdominal masses. Bowel sounds positive. No hernia  GENITALIA: Not examined.  BREASTS: Not examined.  MUSKEL: No joint swelling, decreased motion, or inflammation  EXTREMS: No edema, clubbing, cyanosis, No varicose veins.  NEURO: Grossly nonfocal, Gait is coordinated and smooth, Cognition is preserved.  SKIN: No rashes, no ecchymoses, no petechia.  PSYCH: Oriented to time, place and person. Memory is preserved. Mood and affect appear normal      RECENT LABS:  Lab on  03/05/2018   Component Date Value Ref Range Status   • WBC 03/05/2018 7.35  4.80 - 10.80 10*3/mm3 Final   • RBC 03/05/2018 4.06* 4.80 - 5.90 10*6/mm3 Final   • Hemoglobin 03/05/2018 13.7* 14.0 - 18.0 g/dL Final   • Hematocrit 03/05/2018 38.8* 40.0 - 52.0 % Final   • MCV 03/05/2018 95.6* 82.0 - 95.0 fL Final   • MCH 03/05/2018 33.7* 28.0 - 32.0 pg Final   • MCHC 03/05/2018 35.3  33.0 - 36.0 g/dL Final   • RDW 03/05/2018 12.4  12.0 - 15.0 % Final   • RDW-SD 03/05/2018 43.7  40.0 - 54.0 fl Final   • MPV 03/05/2018 8.6  6.0 - 12.0 fL Final   • Platelets 03/05/2018 829* 130 - 400 10*3/mm3 Final   • Neutrophil % 03/05/2018 55.8  39.0 - 78.0 % Final   • Lymphocyte % 03/05/2018 32.0  15.0 - 45.0 % Final   • Monocyte % 03/05/2018 8.8  4.0 - 12.0 % Final   • Eosinophil % 03/05/2018 1.5  0.0 - 4.0 % Final   • Basophil % 03/05/2018 1.2  0.0 - 2.0 % Final   • Immature Grans % 03/05/2018 0.7  0.0 - 5.0 % Final   • Neutrophils, Absolute 03/05/2018 4.10  1.87 - 8.40 10*3/mm3 Final   • Lymphocytes, Absolute 03/05/2018 2.35  0.72 - 4.86 10*3/mm3 Final   • Monocytes, Absolute 03/05/2018 0.65  0.19 - 1.30 10*3/mm3 Final   • Eosinophils, Absolute 03/05/2018 0.11  0.00 - 0.70 10*3/mm3 Final   • Basophils, Absolute 03/05/2018 0.09  0.00 - 0.20 10*3/mm3 Final   • Immature Grans, Absolute 03/05/2018 0.05* 0.00 - 0.03 10*3/mm3 Final   • nRBC 03/05/2018 0.0  0.0 - 0.0 /100 WBC Final       RADIOLOGY:  No results found.         Assessment/Plan     Essential thrombocytosis with CLAR gene positive.  Currently on 1000 mg of hydroxyurea daily, platelet count down to 397,000.  The goal is to bring the platelet count less than 500,000.  Therefore I would advised him to increase the dose of hydroxyurea to 1500 mg daily and continue one baby aspirin every day and recheck a CBC in 1 month's time.    He has been on Hydrea for last 1 yr and it has kept his Plts well under control. However, Hydrea is an Alkylator and I fear giving the Alkylator for too  long, that can cause additional damage to the BM. Therefore, this year, will stop Hydrea and try Anagleride1 mg bid.  He has been on Anagleride 1mg bid x last 2 months, but the plts went up to 829 today.  Therefor will optomize this with increasing Anagleride to 2 mg bid and recheck plts in 2 wks time. Admits to ASA 81mg daily.          Jonah Dai MD    3/5/2018    4:16 PM

## 2018-03-19 DIAGNOSIS — D47.3 THROMBOCYTHEMIA, ESSENTIAL (HCC): Primary | ICD-10-CM

## 2018-03-20 ENCOUNTER — OFFICE VISIT (OUTPATIENT)
Dept: ONCOLOGY | Facility: CLINIC | Age: 58
End: 2018-03-20

## 2018-03-20 ENCOUNTER — LAB (OUTPATIENT)
Dept: LAB | Facility: HOSPITAL | Age: 58
End: 2018-03-20

## 2018-03-20 VITALS
TEMPERATURE: 97.6 F | BODY MASS INDEX: 33.79 KG/M2 | OXYGEN SATURATION: 98 % | RESPIRATION RATE: 16 BRPM | SYSTOLIC BLOOD PRESSURE: 158 MMHG | WEIGHT: 249.5 LBS | HEIGHT: 72 IN | HEART RATE: 130 BPM | DIASTOLIC BLOOD PRESSURE: 88 MMHG

## 2018-03-20 DIAGNOSIS — D75.839 THROMBOCYTHEMIA: Primary | ICD-10-CM

## 2018-03-20 DIAGNOSIS — D47.3 THROMBOCYTHEMIA, ESSENTIAL (HCC): ICD-10-CM

## 2018-03-20 DIAGNOSIS — D47.3 ESSENTIAL THROMBOCYTOSIS (HCC): Primary | ICD-10-CM

## 2018-03-20 LAB
ALBUMIN SERPL-MCNC: 3.9 G/DL (ref 3.5–5)
ALBUMIN/GLOB SERPL: 1.3 G/DL (ref 1.1–2.5)
ALP SERPL-CCNC: 108 U/L (ref 24–120)
ALT SERPL W P-5'-P-CCNC: 41 U/L (ref 0–54)
ANION GAP SERPL CALCULATED.3IONS-SCNC: 11 MMOL/L (ref 4–13)
AST SERPL-CCNC: 79 U/L (ref 7–45)
BASOPHILS # BLD AUTO: 0.08 10*3/MM3 (ref 0–0.2)
BASOPHILS NFR BLD AUTO: 1.1 % (ref 0–2)
BILIRUB SERPL-MCNC: 0.3 MG/DL (ref 0.1–1)
BUN BLD-MCNC: 20 MG/DL (ref 5–21)
BUN/CREAT SERPL: 22.2 (ref 7–25)
CALCIUM SPEC-SCNC: 8.8 MG/DL (ref 8.4–10.4)
CHLORIDE SERPL-SCNC: 102 MMOL/L (ref 98–110)
CO2 SERPL-SCNC: 26 MMOL/L (ref 24–31)
CREAT BLD-MCNC: 0.9 MG/DL (ref 0.5–1.4)
DEPRECATED RDW RBC AUTO: 40.3 FL (ref 40–54)
EOSINOPHIL # BLD AUTO: 0.17 10*3/MM3 (ref 0–0.7)
EOSINOPHIL NFR BLD AUTO: 2.4 % (ref 0–4)
ERYTHROCYTE [DISTWIDTH] IN BLOOD BY AUTOMATED COUNT: 12 % (ref 12–15)
GFR SERPL CREATININE-BSD FRML MDRD: 88 ML/MIN/1.73
GLOBULIN UR ELPH-MCNC: 3 GM/DL
GLUCOSE BLD-MCNC: 148 MG/DL (ref 70–100)
HCT VFR BLD AUTO: 37.6 % (ref 40–52)
HGB BLD-MCNC: 13.1 G/DL (ref 14–18)
HOLD SPECIMEN: NORMAL
HOLD SPECIMEN: NORMAL
IMM GRANULOCYTES # BLD: 0.05 10*3/MM3 (ref 0–0.03)
IMM GRANULOCYTES NFR BLD: 0.7 % (ref 0–5)
LYMPHOCYTES # BLD AUTO: 2.18 10*3/MM3 (ref 0.72–4.86)
LYMPHOCYTES NFR BLD AUTO: 30.4 % (ref 15–45)
MCH RBC QN AUTO: 31.7 PG (ref 28–32)
MCHC RBC AUTO-ENTMCNC: 34.8 G/DL (ref 33–36)
MCV RBC AUTO: 91 FL (ref 82–95)
MONOCYTES # BLD AUTO: 0.44 10*3/MM3 (ref 0.19–1.3)
MONOCYTES NFR BLD AUTO: 6.1 % (ref 4–12)
NEUTROPHILS # BLD AUTO: 4.24 10*3/MM3 (ref 1.87–8.4)
NEUTROPHILS NFR BLD AUTO: 59.3 % (ref 39–78)
NRBC BLD MANUAL-RTO: 0 /100 WBC (ref 0–0)
PLATELET # BLD AUTO: 844 10*3/MM3 (ref 130–400)
PMV BLD AUTO: 8.9 FL (ref 6–12)
POTASSIUM BLD-SCNC: 3.8 MMOL/L (ref 3.5–5.3)
PROT SERPL-MCNC: 6.9 G/DL (ref 6.3–8.7)
RBC # BLD AUTO: 4.13 10*6/MM3 (ref 4.8–5.9)
SODIUM BLD-SCNC: 139 MMOL/L (ref 135–145)
WBC NRBC COR # BLD: 7.16 10*3/MM3 (ref 4.8–10.8)

## 2018-03-20 PROCEDURE — 99214 OFFICE O/P EST MOD 30 MIN: CPT | Performed by: INTERNAL MEDICINE

## 2018-03-20 PROCEDURE — 80053 COMPREHEN METABOLIC PANEL: CPT | Performed by: INTERNAL MEDICINE

## 2018-03-20 PROCEDURE — 36415 COLL VENOUS BLD VENIPUNCTURE: CPT

## 2018-03-20 PROCEDURE — 85025 COMPLETE CBC W/AUTO DIFF WBC: CPT | Performed by: INTERNAL MEDICINE

## 2018-03-20 RX ORDER — HYDROXYUREA 500 MG/1
500 CAPSULE ORAL 3 TIMES DAILY
Qty: 90 CAPSULE | Refills: 3 | Status: CANCELLED | OUTPATIENT
Start: 2018-03-20

## 2018-03-20 NOTE — PROGRESS NOTES
[facility]  HEMATOLOGY & ONCOLOGY        Subjective     VISIT DIAGNOSIS: Thrombocytosis etiology unclear  Splenomegaly with elevated liver function enzymes, etiology unclear  No diagnosis found.    REASON FOR VISIT:     No chief complaint on file.       HEMATOLOGY / ONCOLOGY HISTORY: This is a 56-year-old white male in the usual state of health about a week ago he felt dizzy and lightheaded and getting and presented himself to the emergency room.  CT scans and MRIs of the head were without any abnormality.  Notable was elevated platelets at 998 as well as elevation of AST and ALT and an ultrasound of the spleen showed a spleen size of 13.5 cm with a normal size is 9 x 10 x 11.  Next    Patient is being referred to hematology for further recommendations, after he was placed on aspirin 81 mg the margins from.   No history exists.     [No treatment plan]  Cancer Staging Information:  No matching staging information was found for the patient.      INTERVAL HISTORY  Patient ID: Devonte Lara is a 57 y.o. year old male This is a 56-year-old white male in the usual state of health about a week ago he felt dizzy and lightheaded and getting and presented himself to the emergency room.  CT scans and MRIs of the head were without any abnormality.  Notable was elevated platelets at 998 as well as elevation of AST and ALT and an ultrasound of the spleen showed a spleen size of 13.5 cm with a normal size is 9 x 10 x 11.         Review of Systems   Constitutional: Negative.    HENT: Negative.    Eyes: Negative.    Respiratory: Negative.    Cardiovascular: Negative.    Gastrointestinal: Negative.    Endocrine: Negative.    Genitourinary: Negative.    Musculoskeletal: Negative.    Skin: Negative.    Allergic/Immunologic: Negative.    Neurological: Negative.    Hematological: Negative.    Psychiatric/Behavioral: Negative.             Medications:    Current Outpatient Prescriptions   Medication Sig Dispense Refill    • anagrelide (AGRYLIN) 1 MG capsule Two tabs po (2 mg) am and two tabs po (2 mg) nite 120 capsule 3   • aspirin 81 MG chewable tablet Chew 81 mg Daily.     • valsartan (DIOVAN) 160 MG tablet        No current facility-administered medications for this visit.        ALLERGIES:  No Known Allergies    Objective      @VITALS    Current Status 3/5/2018   ECOG score 0       General Appearance: Patient is awake, alert, oriented and in no acute distress. Patient is welldeveloped, wellnourished, and appears stated age.  HEENT: Normocephalic. Sclerae clear, conjunctiva pink, extraocular movements intact, pupils, round, reactive to light and  accommodation. Mouth and throat are clear with moist oral mucosa.  NECK: Supple, no jugular venous distention, thyroid not enlarged.  LYMPH: No cervical, supraclavicular, axillary, or inguinal lymphadenopathy.  CHEST: Equal bilateral expansion, AP  diameter normal, resonant percussion note  LUNGS: Good air movement, no rales, rhonchi, rubs or wheezes with auscultation  CARDIO: Regular sinus rhythm, no murmurs, gallops or rubs.  ABDOMEN: Nondistended, soft, No tenderness, no guarding, no rebound, No hepatosplenomegaly. No abdominal masses. Bowel sounds positive. No hernia  GENITALIA: Not examined.  BREASTS: Not examined.  MUSKEL: No joint swelling, decreased motion, or inflammation  EXTREMS: No edema, clubbing, cyanosis, No varicose veins.  NEURO: Grossly nonfocal, Gait is coordinated and smooth, Cognition is preserved.  SKIN: No rashes, no ecchymoses, no petechia.  PSYCH: Oriented to time, place and person. Memory is preserved. Mood and affect appear normal      RECENT LABS:  Lab on 03/20/2018   Component Date Value Ref Range Status   • Glucose 03/20/2018 148* 70 - 100 mg/dL Final   • BUN 03/20/2018 20  5 - 21 mg/dL Final   • Creatinine 03/20/2018 0.90  0.50 - 1.40 mg/dL Final   • Sodium 03/20/2018 139  135 - 145 mmol/L Final   • Potassium 03/20/2018 3.8  3.5 - 5.3 mmol/L Final   •  Chloride 03/20/2018 102  98 - 110 mmol/L Final   • CO2 03/20/2018 26.0  24.0 - 31.0 mmol/L Final   • Calcium 03/20/2018 8.8  8.4 - 10.4 mg/dL Final   • Total Protein 03/20/2018 6.9  6.3 - 8.7 g/dL Final   • Albumin 03/20/2018 3.90  3.50 - 5.00 g/dL Final   • ALT (SGPT) 03/20/2018 41  0 - 54 U/L Final   • AST (SGOT) 03/20/2018 79* 7 - 45 U/L Final   • Alkaline Phosphatase 03/20/2018 108  24 - 120 U/L Final   • Total Bilirubin 03/20/2018 0.3  0.1 - 1.0 mg/dL Final   • eGFR Non  Amer 03/20/2018 88  >60 mL/min/1.73 Final   • Globulin 03/20/2018 3.0  gm/dL Final   • A/G Ratio 03/20/2018 1.3  1.1 - 2.5 g/dL Final   • BUN/Creatinine Ratio 03/20/2018 22.2  7.0 - 25.0 Final   • Anion Gap 03/20/2018 11.0  4.0 - 13.0 mmol/L Final   • WBC 03/20/2018 7.16  4.80 - 10.80 10*3/mm3 Final   • RBC 03/20/2018 4.13* 4.80 - 5.90 10*6/mm3 Final   • Hemoglobin 03/20/2018 13.1* 14.0 - 18.0 g/dL Final   • Hematocrit 03/20/2018 37.6* 40.0 - 52.0 % Final   • MCV 03/20/2018 91.0  82.0 - 95.0 fL Final   • MCH 03/20/2018 31.7  28.0 - 32.0 pg Final   • MCHC 03/20/2018 34.8  33.0 - 36.0 g/dL Final   • RDW 03/20/2018 12.0  12.0 - 15.0 % Final   • RDW-SD 03/20/2018 40.3  40.0 - 54.0 fl Final   • MPV 03/20/2018 8.9  6.0 - 12.0 fL Final   • Platelets 03/20/2018 844* 130 - 400 10*3/mm3 Final   • Neutrophil % 03/20/2018 59.3  39.0 - 78.0 % Final   • Lymphocyte % 03/20/2018 30.4  15.0 - 45.0 % Final   • Monocyte % 03/20/2018 6.1  4.0 - 12.0 % Final   • Eosinophil % 03/20/2018 2.4  0.0 - 4.0 % Final   • Basophil % 03/20/2018 1.1  0.0 - 2.0 % Final   • Immature Grans % 03/20/2018 0.7  0.0 - 5.0 % Final   • Neutrophils, Absolute 03/20/2018 4.24  1.87 - 8.40 10*3/mm3 Final   • Lymphocytes, Absolute 03/20/2018 2.18  0.72 - 4.86 10*3/mm3 Final   • Monocytes, Absolute 03/20/2018 0.44  0.19 - 1.30 10*3/mm3 Final   • Eosinophils, Absolute 03/20/2018 0.17  0.00 - 0.70 10*3/mm3 Final   • Basophils, Absolute 03/20/2018 0.08  0.00 - 0.20 10*3/mm3 Final   •  Immature Grans, Absolute 03/20/2018 0.05* 0.00 - 0.03 10*3/mm3 Final   • nRBC 03/20/2018 0.0  0.0 - 0.0 /100 WBC Final       RADIOLOGY:  No results found.         Assessment/Plan     Essential thrombocytosis with CLAR gene positive.  Currently on 1000 mg of hydroxyurea daily, platelet count down to 397,000.  The goal is to bring the platelet count less than 500,000.  Therefore I would advised him to increase the dose of hydroxyurea to 1500 mg daily and continue one baby aspirin every day and recheck a CBC in 1 month's time.    He has been on Hydrea for last 1 yr and it has kept his Plts well under control. However, Hydrea is an Alkylator and I fear giving the Alkylator for too long, that can cause additional damage to the BM. Therefore, this year, will stop Hydrea and try Anagleride1 mg bid.  He has been on Anagleride 1mg bid x last 2 months, but the plts went up to 829 today.  Therefor will optomize this with increasing Anagleride to 2 mg bid and recheck plts in 2 wks time. Admits to ASA 81mg daily.    After Optimal dose of Anagleride 2 mg bid the Platelets have NOT changed and they are presistently hih over 800K Als he started having palpitations. Therefore he is RESISTENT to anagleride.  Plan to STOP Anagleriade and start Hydrea 1000mg q day. Recheck Plts in 6 wks.          Jonah Dai MD    3/20/2018    2:56 PM

## 2018-03-21 ENCOUNTER — APPOINTMENT (OUTPATIENT)
Dept: LAB | Facility: HOSPITAL | Age: 58
End: 2018-03-21

## 2018-04-23 DIAGNOSIS — D47.3 THROMBOCYTHEMIA, ESSENTIAL (HCC): Primary | ICD-10-CM

## 2018-04-25 ENCOUNTER — APPOINTMENT (OUTPATIENT)
Dept: LAB | Facility: HOSPITAL | Age: 58
End: 2018-04-25

## 2018-04-25 ENCOUNTER — OFFICE VISIT (OUTPATIENT)
Dept: ONCOLOGY | Facility: CLINIC | Age: 58
End: 2018-04-25

## 2018-04-25 VITALS
RESPIRATION RATE: 16 BRPM | TEMPERATURE: 97.3 F | HEIGHT: 72 IN | BODY MASS INDEX: 34.65 KG/M2 | WEIGHT: 255.8 LBS | SYSTOLIC BLOOD PRESSURE: 152 MMHG | OXYGEN SATURATION: 98 % | DIASTOLIC BLOOD PRESSURE: 88 MMHG | HEART RATE: 96 BPM

## 2018-04-25 DIAGNOSIS — D47.3 ESSENTIAL THROMBOCYTOSIS (HCC): Primary | ICD-10-CM

## 2018-04-25 LAB
ALBUMIN SERPL-MCNC: 4.3 G/DL (ref 3.5–5)
ALBUMIN/GLOB SERPL: 1.7 G/DL (ref 1.1–2.5)
ALP SERPL-CCNC: 78 U/L (ref 24–120)
ALT SERPL W P-5'-P-CCNC: 65 U/L (ref 0–54)
ANION GAP SERPL CALCULATED.3IONS-SCNC: 12 MMOL/L (ref 4–13)
AST SERPL-CCNC: 87 U/L (ref 7–45)
BASOPHILS # BLD AUTO: 0.05 10*3/MM3 (ref 0–0.2)
BASOPHILS NFR BLD AUTO: 0.8 % (ref 0–2)
BILIRUB SERPL-MCNC: 1.1 MG/DL (ref 0.1–1)
BUN BLD-MCNC: 13 MG/DL (ref 5–21)
BUN/CREAT SERPL: 14.8 (ref 7–25)
CALCIUM SPEC-SCNC: 9.1 MG/DL (ref 8.4–10.4)
CHLORIDE SERPL-SCNC: 100 MMOL/L (ref 98–110)
CO2 SERPL-SCNC: 28 MMOL/L (ref 24–31)
CREAT BLD-MCNC: 0.88 MG/DL (ref 0.5–1.4)
DEPRECATED RDW RBC AUTO: 58.3 FL (ref 40–54)
EOSINOPHIL # BLD AUTO: 0.04 10*3/MM3 (ref 0–0.7)
EOSINOPHIL NFR BLD AUTO: 0.7 % (ref 0–4)
ERYTHROCYTE [DISTWIDTH] IN BLOOD BY AUTOMATED COUNT: 18 % (ref 12–15)
GFR SERPL CREATININE-BSD FRML MDRD: 90 ML/MIN/1.73
GLOBULIN UR ELPH-MCNC: 2.6 GM/DL
GLUCOSE BLD-MCNC: 96 MG/DL (ref 70–100)
HCT VFR BLD AUTO: 38 % (ref 40–52)
HGB BLD-MCNC: 13.3 G/DL (ref 14–18)
HOLD SPECIMEN: NORMAL
HOLD SPECIMEN: NORMAL
IMM GRANULOCYTES # BLD: 0.05 10*3/MM3 (ref 0–0.03)
IMM GRANULOCYTES NFR BLD: 0.8 % (ref 0–5)
LYMPHOCYTES # BLD AUTO: 2.15 10*3/MM3 (ref 0.72–4.86)
LYMPHOCYTES NFR BLD AUTO: 35.3 % (ref 15–45)
MCH RBC QN AUTO: 32.7 PG (ref 28–32)
MCHC RBC AUTO-ENTMCNC: 35 G/DL (ref 33–36)
MCV RBC AUTO: 93.4 FL (ref 82–95)
MONOCYTES # BLD AUTO: 0.54 10*3/MM3 (ref 0.19–1.3)
MONOCYTES NFR BLD AUTO: 8.9 % (ref 4–12)
NEUTROPHILS # BLD AUTO: 3.26 10*3/MM3 (ref 1.87–8.4)
NEUTROPHILS NFR BLD AUTO: 53.5 % (ref 39–78)
NRBC BLD MANUAL-RTO: 0 /100 WBC (ref 0–0)
PLATELET # BLD AUTO: 486 10*3/MM3 (ref 130–400)
PMV BLD AUTO: 8.6 FL (ref 6–12)
POTASSIUM BLD-SCNC: 4.3 MMOL/L (ref 3.5–5.3)
PROT SERPL-MCNC: 6.9 G/DL (ref 6.3–8.7)
RBC # BLD AUTO: 4.07 10*6/MM3 (ref 4.8–5.9)
SODIUM BLD-SCNC: 140 MMOL/L (ref 135–145)
WBC NRBC COR # BLD: 6.09 10*3/MM3 (ref 4.8–10.8)

## 2018-04-25 PROCEDURE — 80053 COMPREHEN METABOLIC PANEL: CPT | Performed by: INTERNAL MEDICINE

## 2018-04-25 PROCEDURE — 36415 COLL VENOUS BLD VENIPUNCTURE: CPT

## 2018-04-25 PROCEDURE — 85025 COMPLETE CBC W/AUTO DIFF WBC: CPT | Performed by: INTERNAL MEDICINE

## 2018-04-25 PROCEDURE — 99214 OFFICE O/P EST MOD 30 MIN: CPT | Performed by: INTERNAL MEDICINE

## 2018-04-25 RX ORDER — HYDROXYUREA 500 MG/1
CAPSULE ORAL
COMMUNITY
Start: 2018-04-04 | End: 2018-08-24 | Stop reason: SDUPTHER

## 2018-04-25 NOTE — PROGRESS NOTES
[facility]  HEMATOLOGY & ONCOLOGY        Subjective     VISIT DIAGNOSIS: Thrombocytosis etiology unclear  Splenomegaly with elevated liver function enzymes, etiology unclear  No diagnosis found.    REASON FOR VISIT:     No chief complaint on file.       HEMATOLOGY / ONCOLOGY HISTORY: This is a 56-year-old white male in the usual state of health about a week ago he felt dizzy and lightheaded and getting and presented himself to the emergency room.  CT scans and MRIs of the head were without any abnormality.  Notable was elevated platelets at 998 as well as elevation of AST and ALT and an ultrasound of the spleen showed a spleen size of 13.5 cm with a normal size is 9 x 10 x 11.  Next    Patient is being referred to hematology for further recommendations, after he was placed on aspirin 81 mg the margins from.   No history exists.     [No treatment plan]  Cancer Staging Information:  No matching staging information was found for the patient.      INTERVAL HISTORY  Patient ID: Devonte Lara is a 57 y.o. year old male This is a 56-year-old white male in the usual state of health about a week ago he felt dizzy and lightheaded and getting and presented himself to the emergency room.  CT scans and MRIs of the head were without any abnormality.  Notable was elevated platelets at 998 as well as elevation of AST and ALT and an ultrasound of the spleen showed a spleen size of 13.5 cm with a normal size is 9 x 10 x 11.         Review of Systems   Constitutional: Negative.    HENT: Negative.    Eyes: Negative.    Respiratory: Negative.    Cardiovascular: Negative.    Gastrointestinal: Negative.    Endocrine: Negative.    Genitourinary: Negative.    Musculoskeletal: Negative.    Skin: Negative.    Allergic/Immunologic: Negative.    Neurological: Negative.    Hematological: Negative.    Psychiatric/Behavioral: Negative.             Medications:    Current Outpatient Prescriptions   Medication Sig Dispense Refill    • aspirin 81 MG chewable tablet Chew 81 mg Daily.     • hydroxyurea (HYDREA) 500 MG capsule      • valsartan (DIOVAN) 160 MG tablet        No current facility-administered medications for this visit.        ALLERGIES:  No Known Allergies    Objective      @VITALS    Current Status 4/25/2018   ECOG score 0       General Appearance: Patient is awake, alert, oriented and in no acute distress. Patient is welldeveloped, wellnourished, and appears stated age.  HEENT: Normocephalic. Sclerae clear, conjunctiva pink, extraocular movements intact, pupils, round, reactive to light and  accommodation. Mouth and throat are clear with moist oral mucosa.  NECK: Supple, no jugular venous distention, thyroid not enlarged.  LYMPH: No cervical, supraclavicular, axillary, or inguinal lymphadenopathy.  CHEST: Equal bilateral expansion, AP  diameter normal, resonant percussion note  LUNGS: Good air movement, no rales, rhonchi, rubs or wheezes with auscultation  CARDIO: Regular sinus rhythm, no murmurs, gallops or rubs.  ABDOMEN: Nondistended, soft, No tenderness, no guarding, no rebound, No hepatosplenomegaly. No abdominal masses. Bowel sounds positive. No hernia  GENITALIA: Not examined.  BREASTS: Not examined.  MUSKEL: No joint swelling, decreased motion, or inflammation  EXTREMS: No edema, clubbing, cyanosis, No varicose veins.  NEURO: Grossly nonfocal, Gait is coordinated and smooth, Cognition is preserved.  SKIN: No rashes, no ecchymoses, no petechia.  PSYCH: Oriented to time, place and person. Memory is preserved. Mood and affect appear normal      RECENT LABS:  Orders Only on 04/23/2018   Component Date Value Ref Range Status   • WBC 04/25/2018 6.09  4.80 - 10.80 10*3/mm3 Final   • RBC 04/25/2018 4.07* 4.80 - 5.90 10*6/mm3 Final   • Hemoglobin 04/25/2018 13.3* 14.0 - 18.0 g/dL Final   • Hematocrit 04/25/2018 38.0* 40.0 - 52.0 % Final   • MCV 04/25/2018 93.4  82.0 - 95.0 fL Final   • MCH 04/25/2018 32.7* 28.0 - 32.0 pg Final   •  MCHC 04/25/2018 35.0  33.0 - 36.0 g/dL Final   • RDW 04/25/2018 18.0* 12.0 - 15.0 % Final   • RDW-SD 04/25/2018 58.3* 40.0 - 54.0 fl Final   • MPV 04/25/2018 8.6  6.0 - 12.0 fL Final   • Platelets 04/25/2018 486* 130 - 400 10*3/mm3 Final   • Neutrophil % 04/25/2018 53.5  39.0 - 78.0 % Final   • Lymphocyte % 04/25/2018 35.3  15.0 - 45.0 % Final   • Monocyte % 04/25/2018 8.9  4.0 - 12.0 % Final   • Eosinophil % 04/25/2018 0.7  0.0 - 4.0 % Final   • Basophil % 04/25/2018 0.8  0.0 - 2.0 % Final   • Immature Grans % 04/25/2018 0.8  0.0 - 5.0 % Final   • Neutrophils, Absolute 04/25/2018 3.26  1.87 - 8.40 10*3/mm3 Final   • Lymphocytes, Absolute 04/25/2018 2.15  0.72 - 4.86 10*3/mm3 Final   • Monocytes, Absolute 04/25/2018 0.54  0.19 - 1.30 10*3/mm3 Final   • Eosinophils, Absolute 04/25/2018 0.04  0.00 - 0.70 10*3/mm3 Final   • Basophils, Absolute 04/25/2018 0.05  0.00 - 0.20 10*3/mm3 Final   • Immature Grans, Absolute 04/25/2018 0.05* 0.00 - 0.03 10*3/mm3 Final   • nRBC 04/25/2018 0.0  0.0 - 0.0 /100 WBC Final       RADIOLOGY:  No results found.         Assessment/Plan     Essential thrombocytosis with CLAR gene positive.  Currently on 1000 mg of hydroxyurea daily, platelet count down to 397,000.  The goal is to bring the platelet count less than 500,000.  Therefore I would advised him to increase the dose of hydroxyurea to 1500 mg daily and continue one baby aspirin every day and recheck a CBC in 1 month's time.    He has been on Hydrea for last 1 yr and it has kept his Plts well under control. However, Hydrea is an Alkylator and I fear giving the Alkylator for too long, that can cause additional damage to the BM. Therefore, this year, will stop Hydrea and try Anagleride1 mg bid.  He has been on Anagleride 1mg bid x last 2 months, but the plts went up to 829 today.  Therefor will optomize this with increasing Anagleride to 2 mg bid and recheck plts in 2 wks time. Admits to ASA 81mg daily.    After Optimal dose of  Anagleride 2 mg bid the Platelets have NOT changed and they are presistently hih over 800K Als he started having palpitations. Therefore he is RESISTENT to anagleride.  Plan to STOP Anagleriade, On Hydrea 1000mg q day and responding, Plts down 486. Goal is to keep plts less than 500k + ASA 81mg once a day.          Jonah Dai MD    4/25/2018    3:37 PM

## 2018-06-21 RX ORDER — HYDROXYUREA 500 MG/1
CAPSULE ORAL
Qty: 90 CAPSULE | Refills: 1 | Status: SHIPPED | OUTPATIENT
Start: 2018-06-21 | End: 2018-09-14 | Stop reason: SDUPTHER

## 2018-07-31 DIAGNOSIS — D47.3 THROMBOCYTHEMIA, ESSENTIAL (HCC): Primary | ICD-10-CM

## 2018-08-02 ENCOUNTER — APPOINTMENT (OUTPATIENT)
Dept: LAB | Facility: HOSPITAL | Age: 58
End: 2018-08-02

## 2018-08-14 DIAGNOSIS — D47.3 THROMBOCYTHEMIA, ESSENTIAL (HCC): Primary | ICD-10-CM

## 2018-08-16 ENCOUNTER — APPOINTMENT (OUTPATIENT)
Dept: LAB | Facility: HOSPITAL | Age: 58
End: 2018-08-16

## 2018-08-23 DIAGNOSIS — D47.3 THROMBOCYTHEMIA, ESSENTIAL (HCC): Primary | ICD-10-CM

## 2018-08-24 ENCOUNTER — APPOINTMENT (OUTPATIENT)
Dept: LAB | Facility: HOSPITAL | Age: 58
End: 2018-08-24
Attending: INTERNAL MEDICINE

## 2018-08-24 ENCOUNTER — OFFICE VISIT (OUTPATIENT)
Dept: ONCOLOGY | Facility: CLINIC | Age: 58
End: 2018-08-24

## 2018-08-24 VITALS
SYSTOLIC BLOOD PRESSURE: 148 MMHG | WEIGHT: 273.3 LBS | RESPIRATION RATE: 16 BRPM | OXYGEN SATURATION: 98 % | HEIGHT: 72 IN | DIASTOLIC BLOOD PRESSURE: 78 MMHG | TEMPERATURE: 97.7 F | BODY MASS INDEX: 37.02 KG/M2 | HEART RATE: 76 BPM

## 2018-08-24 DIAGNOSIS — D69.6 THROMBOCYTOPENIA (HCC): Primary | ICD-10-CM

## 2018-08-24 DIAGNOSIS — D75.839 THROMBOCYTHEMIA: Primary | ICD-10-CM

## 2018-08-24 LAB
ALBUMIN SERPL-MCNC: 4.4 G/DL (ref 3.5–5)
ALBUMIN/GLOB SERPL: 1.6 G/DL (ref 1.1–2.5)
ALP SERPL-CCNC: 72 U/L (ref 24–120)
ALT SERPL W P-5'-P-CCNC: 53 U/L (ref 0–54)
ANION GAP SERPL CALCULATED.3IONS-SCNC: 11 MMOL/L (ref 4–13)
AST SERPL-CCNC: 46 U/L (ref 7–45)
BASOPHILS # BLD AUTO: 0.04 10*3/MM3 (ref 0–0.2)
BASOPHILS NFR BLD AUTO: 0.7 % (ref 0–2)
BILIRUB SERPL-MCNC: 0.8 MG/DL (ref 0.1–1)
BUN BLD-MCNC: 14 MG/DL (ref 5–21)
BUN/CREAT SERPL: 14.9 (ref 7–25)
CALCIUM SPEC-SCNC: 9.2 MG/DL (ref 8.4–10.4)
CHLORIDE SERPL-SCNC: 104 MMOL/L (ref 98–110)
CO2 SERPL-SCNC: 25 MMOL/L (ref 24–31)
CREAT BLD-MCNC: 0.94 MG/DL (ref 0.5–1.4)
DEPRECATED RDW RBC AUTO: 48.8 FL (ref 40–54)
EOSINOPHIL # BLD AUTO: 0.04 10*3/MM3 (ref 0–0.7)
EOSINOPHIL NFR BLD AUTO: 0.7 % (ref 0–4)
ERYTHROCYTE [DISTWIDTH] IN BLOOD BY AUTOMATED COUNT: 12.8 % (ref 12–15)
FERRITIN SERPL-MCNC: 205 NG/ML (ref 17.9–464)
FOLATE SERPL-MCNC: >20 NG/ML
GFR SERPL CREATININE-BSD FRML MDRD: 83 ML/MIN/1.73
GLOBULIN UR ELPH-MCNC: 2.7 GM/DL
GLUCOSE BLD-MCNC: 114 MG/DL (ref 70–100)
HCT VFR BLD AUTO: 40.2 % (ref 40–52)
HGB BLD-MCNC: 14.7 G/DL (ref 14–18)
HOLD SPECIMEN: NORMAL
HOLD SPECIMEN: NORMAL
IMM GRANULOCYTES # BLD: 0.02 10*3/MM3 (ref 0–0.03)
IMM GRANULOCYTES NFR BLD: 0.4 % (ref 0–5)
LYMPHOCYTES # BLD AUTO: 2.03 10*3/MM3 (ref 0.72–4.86)
LYMPHOCYTES NFR BLD AUTO: 36.5 % (ref 15–45)
MCH RBC QN AUTO: 38.4 PG (ref 28–32)
MCHC RBC AUTO-ENTMCNC: 36.6 G/DL (ref 33–36)
MCV RBC AUTO: 105 FL (ref 82–95)
MONOCYTES # BLD AUTO: 0.56 10*3/MM3 (ref 0.19–1.3)
MONOCYTES NFR BLD AUTO: 10.1 % (ref 4–12)
NEUTROPHILS # BLD AUTO: 2.87 10*3/MM3 (ref 1.87–8.4)
NEUTROPHILS NFR BLD AUTO: 51.6 % (ref 39–78)
NRBC BLD MANUAL-RTO: 0 /100 WBC (ref 0–0)
PLATELET # BLD AUTO: 482 10*3/MM3 (ref 130–400)
PMV BLD AUTO: 8.9 FL (ref 6–12)
POTASSIUM BLD-SCNC: 4.4 MMOL/L (ref 3.5–5.3)
PROT SERPL-MCNC: 7.1 G/DL (ref 6.3–8.7)
RBC # BLD AUTO: 3.83 10*6/MM3 (ref 4.8–5.9)
SODIUM BLD-SCNC: 140 MMOL/L (ref 135–145)
VIT B12 BLD-MCNC: 211 PG/ML (ref 239–931)
WBC NRBC COR # BLD: 5.56 10*3/MM3 (ref 4.8–10.8)

## 2018-08-24 PROCEDURE — 82607 VITAMIN B-12: CPT | Performed by: INTERNAL MEDICINE

## 2018-08-24 PROCEDURE — 99213 OFFICE O/P EST LOW 20 MIN: CPT | Performed by: INTERNAL MEDICINE

## 2018-08-24 PROCEDURE — 85025 COMPLETE CBC W/AUTO DIFF WBC: CPT | Performed by: INTERNAL MEDICINE

## 2018-08-24 PROCEDURE — 80053 COMPREHEN METABOLIC PANEL: CPT | Performed by: INTERNAL MEDICINE

## 2018-08-24 PROCEDURE — 82728 ASSAY OF FERRITIN: CPT | Performed by: INTERNAL MEDICINE

## 2018-08-24 PROCEDURE — 82746 ASSAY OF FOLIC ACID SERUM: CPT | Performed by: INTERNAL MEDICINE

## 2018-08-24 PROCEDURE — 36415 COLL VENOUS BLD VENIPUNCTURE: CPT

## 2018-08-27 ENCOUNTER — TELEPHONE (OUTPATIENT)
Dept: ONCOLOGY | Facility: CLINIC | Age: 58
End: 2018-08-27

## 2018-08-27 NOTE — TELEPHONE ENCOUNTER
----- Message from Antonio Fitzgerald MD sent at 8/27/2018  1:21 PM CDT -----  Please call the patient regarding his abnormal result. Need B12 shots. Start with couple a week , then weekly, then monthly. Thanks.

## 2018-08-27 NOTE — TELEPHONE ENCOUNTER
Received return call from Henok.  Discussed with him that his B12 level is low and Dr. Fitzgerald wants him to get B12 injections.  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

## 2018-08-28 PROBLEM — E53.8 VITAMIN B12 DEFICIENCY: Status: ACTIVE | Noted: 2018-08-28

## 2018-09-14 RX ORDER — HYDROXYUREA 500 MG/1
CAPSULE ORAL
Qty: 90 CAPSULE | Refills: 1 | Status: SHIPPED | OUTPATIENT
Start: 2018-09-14 | End: 2018-10-11 | Stop reason: SDUPTHER

## 2018-10-11 RX ORDER — HYDROXYUREA 500 MG/1
CAPSULE ORAL
Qty: 90 CAPSULE | Refills: 3 | Status: SHIPPED | OUTPATIENT
Start: 2018-10-11 | End: 2019-06-01 | Stop reason: SDUPTHER

## 2018-12-20 DIAGNOSIS — D75.839 THROMBOCYTHEMIA: Primary | ICD-10-CM

## 2018-12-26 ENCOUNTER — LAB (OUTPATIENT)
Dept: LAB | Facility: HOSPITAL | Age: 58
End: 2018-12-26

## 2018-12-26 ENCOUNTER — OFFICE VISIT (OUTPATIENT)
Dept: ONCOLOGY | Facility: CLINIC | Age: 58
End: 2018-12-26

## 2018-12-26 VITALS
BODY MASS INDEX: 37.5 KG/M2 | WEIGHT: 276.9 LBS | TEMPERATURE: 97.8 F | HEART RATE: 80 BPM | HEIGHT: 72 IN | DIASTOLIC BLOOD PRESSURE: 84 MMHG | SYSTOLIC BLOOD PRESSURE: 148 MMHG | RESPIRATION RATE: 16 BRPM

## 2018-12-26 DIAGNOSIS — D47.3 ESSENTIAL THROMBOCYTOSIS (HCC): Primary | ICD-10-CM

## 2018-12-26 DIAGNOSIS — D75.839 THROMBOCYTHEMIA: ICD-10-CM

## 2018-12-26 DIAGNOSIS — D47.3 THROMBOCYTHEMIA, ESSENTIAL (HCC): Primary | ICD-10-CM

## 2018-12-26 LAB
ALBUMIN SERPL-MCNC: 4.3 G/DL (ref 3.5–5)
ALBUMIN/GLOB SERPL: 1.4 G/DL (ref 1.1–2.5)
ALP SERPL-CCNC: 70 U/L (ref 24–120)
ALT SERPL W P-5'-P-CCNC: 45 U/L (ref 0–54)
ANION GAP SERPL CALCULATED.3IONS-SCNC: 9 MMOL/L (ref 4–13)
AST SERPL-CCNC: 42 U/L (ref 7–45)
BASOPHILS # BLD AUTO: 0.06 10*3/MM3 (ref 0–0.2)
BASOPHILS NFR BLD AUTO: 1 % (ref 0–2)
BILIRUB SERPL-MCNC: 1.2 MG/DL (ref 0.1–1)
BUN BLD-MCNC: 14 MG/DL (ref 5–21)
BUN/CREAT SERPL: 15.9 (ref 7–25)
CALCIUM SPEC-SCNC: 8.8 MG/DL (ref 8.4–10.4)
CHLORIDE SERPL-SCNC: 104 MMOL/L (ref 98–110)
CO2 SERPL-SCNC: 24 MMOL/L (ref 24–31)
CREAT BLD-MCNC: 0.88 MG/DL (ref 0.5–1.4)
DEPRECATED RDW RBC AUTO: 47.6 FL (ref 40–54)
EOSINOPHIL # BLD AUTO: 0.04 10*3/MM3 (ref 0–0.7)
EOSINOPHIL NFR BLD AUTO: 0.7 % (ref 0–4)
ERYTHROCYTE [DISTWIDTH] IN BLOOD BY AUTOMATED COUNT: 13.3 % (ref 12–15)
GFR SERPL CREATININE-BSD FRML MDRD: 90 ML/MIN/1.73
GLOBULIN UR ELPH-MCNC: 3 GM/DL
GLUCOSE BLD-MCNC: 122 MG/DL (ref 70–100)
HCT VFR BLD AUTO: 40 % (ref 40–52)
HGB BLD-MCNC: 14.9 G/DL (ref 14–18)
HOLD SPECIMEN: NORMAL
HOLD SPECIMEN: NORMAL
LYMPHOCYTES # BLD AUTO: 1.9 10*3/MM3 (ref 0.72–4.86)
LYMPHOCYTES NFR BLD AUTO: 31.3 % (ref 15–45)
MCH RBC QN AUTO: 36.8 PG (ref 28–32)
MCHC RBC AUTO-ENTMCNC: 37.3 G/DL (ref 33–36)
MCV RBC AUTO: 98.8 FL (ref 82–95)
MONOCYTES # BLD AUTO: 0.57 10*3/MM3 (ref 0.19–1.3)
MONOCYTES NFR BLD AUTO: 9.4 % (ref 4–12)
NEUTROPHILS # BLD AUTO: 3.47 10*3/MM3 (ref 1.87–8.4)
NEUTROPHILS NFR BLD AUTO: 57.1 % (ref 39–78)
PLATELET # BLD AUTO: 616 10*3/MM3 (ref 130–400)
PMV BLD AUTO: 9 FL (ref 6–12)
POTASSIUM BLD-SCNC: 3.9 MMOL/L (ref 3.5–5.3)
PROT SERPL-MCNC: 7.3 G/DL (ref 6.3–8.7)
RBC # BLD AUTO: 4.05 10*6/MM3 (ref 4.8–5.9)
SODIUM BLD-SCNC: 137 MMOL/L (ref 135–145)
WBC NRBC COR # BLD: 6.07 10*3/MM3 (ref 4.8–10.8)

## 2018-12-26 PROCEDURE — 99214 OFFICE O/P EST MOD 30 MIN: CPT | Performed by: INTERNAL MEDICINE

## 2018-12-26 PROCEDURE — 85025 COMPLETE CBC W/AUTO DIFF WBC: CPT

## 2018-12-26 PROCEDURE — 36415 COLL VENOUS BLD VENIPUNCTURE: CPT

## 2018-12-26 PROCEDURE — 80053 COMPREHEN METABOLIC PANEL: CPT

## 2018-12-26 NOTE — PROGRESS NOTES
[facility]  HEMATOLOGY & ONCOLOGY    Cancer Staging Information:  Cancer Staging  No matching staging information was found for the patient.      Subjective     VISIT DIAGNOSIS:   Encounter Diagnosis   Name Primary?   • Essential thrombocytosis (CMS/HCC) Yes       REASON FOR VISIT:     Chief Complaint   Patient presents with   • Thrombocytopenia     follow up, no issues        HEMATOLOGY / ONCOLOGY HISTORY:    No history exists.           INTERVAL HISTORY  Patient ID: Devonte Lara is a 58 y.o. year old male with ET here for f/u. No issues. No rash, no mucositis. On 1000mg HU totoal dose bid.Denies missing dose His appetite is too good. He gained weight  Past Medical History:   Past Medical History:   Diagnosis Date   • Gout    • Hypertension      Past Surgical History:   Past Surgical History:   Procedure Laterality Date   • CYST REMOVAL      left neck     Social History:   Social History     Socioeconomic History   • Marital status:      Spouse name: Not on file   • Number of children: Not on file   • Years of education: Not on file   • Highest education level: Not on file   Social Needs   • Financial resource strain: Not on file   • Food insecurity - worry: Not on file   • Food insecurity - inability: Not on file   • Transportation needs - medical: Not on file   • Transportation needs - non-medical: Not on file   Occupational History   • Not on file   Tobacco Use   • Smoking status: Never Smoker   Substance and Sexual Activity   • Alcohol use: Yes   • Drug use: No   • Sexual activity: Yes     Partners: Female   Other Topics Concern   • Not on file   Social History Narrative   • Not on file     Family History:   Family History   Problem Relation Age of Onset   • Diabetes Father    • Cancer Paternal Aunt    • Cancer Maternal Grandmother         Tumor Pituatary gland       Review of Systems   Constitutional: Negative.    HENT: Negative.    Eyes: Negative.    Respiratory: Negative.   "  Cardiovascular: Negative.    Gastrointestinal: Negative.    Endocrine: Negative.    Genitourinary: Negative.    Musculoskeletal: Negative.    Skin: Negative.    Neurological: Negative.    Hematological: Negative.    Psychiatric/Behavioral: Negative.         Performance Status:  Asymptomatic    Medications:    Current Outpatient Medications   Medication Sig Dispense Refill   • aspirin 81 MG chewable tablet Chew 81 mg Daily.     • hydroxyurea (HYDREA) 500 MG capsule TAKE ONE CAPSULE BY MOUTH THREE TIMES A DAY 90 capsule 3   • valsartan (DIOVAN) 160 MG tablet        No current facility-administered medications for this visit.        ALLERGIES:  No Known Allergies    Objective      Vitals:    12/26/18 0854   BP: 148/84   Pulse: 80   Resp: 16   Temp: 97.8 °F (36.6 °C)   TempSrc: Tympanic   Weight: 126 kg (276 lb 14.4 oz)   Height: 182.9 cm (72\")         Current Status 8/24/2018   ECOG score 0         Physical Exam    General Appearance: Patient is awake, alert, oriented and in no acute distress. Patient is welldeveloped, wellnourished, and appears stated age.  HEENT: Normocephalic. Sclerae clear, conjunctiva pink, extraocular movements intact, pupils, round, reactive to light and  accommodation. Mouth and throat are clear with moist oral mucosa.  NECK: Supple, no jugular venous distention, thyroid not enlarged.  LYMPH: No cervical, supraclavicular, axillary, or inguinal lymphadenopathy.  CHEST: Equal bilateral expansion, AP  diameter normal, resonant percussion note  LUNGS: Good air movement, no rales, rhonchi, rubs or wheezes with auscultation  CARDIO: Regular sinus rhythm, no murmurs, gallops or rubs.  ABDOMEN: Nondistended, soft, No tenderness, no guarding, no rebound, No hepatosplenomegaly. No abdominal masses. Bowel sounds positive. No hernia  GENITALIA: Not examined.  BREASTS: Not examined.  MUSKEL: No joint swelling, decreased motion, or inflammation  EXTREMS: No edema, clubbing, cyanosis, No varicose " veins.  NEURO: Grossly nonfocal, Gait is coordinated and smooth, Cognition is preserved.  SKIN: No rashes, no ecchymoses, no petechia.  PSYCH: Oriented to time, place and person. Memory is preserved. Mood and affect appear normal  RECENT LABS:  Lab on 12/26/2018   Component Date Value Ref Range Status   • Glucose 12/26/2018 122* 70 - 100 mg/dL Final   • BUN 12/26/2018 14  5 - 21 mg/dL Final   • Creatinine 12/26/2018 0.88  0.50 - 1.40 mg/dL Final   • Sodium 12/26/2018 137  135 - 145 mmol/L Final   • Potassium 12/26/2018 3.9  3.5 - 5.3 mmol/L Final   • Chloride 12/26/2018 104  98 - 110 mmol/L Final   • CO2 12/26/2018 24.0  24.0 - 31.0 mmol/L Final   • Calcium 12/26/2018 8.8  8.4 - 10.4 mg/dL Final   • Total Protein 12/26/2018 7.3  6.3 - 8.7 g/dL Final   • Albumin 12/26/2018 4.30  3.50 - 5.00 g/dL Final   • ALT (SGPT) 12/26/2018 45  0 - 54 U/L Final   • AST (SGOT) 12/26/2018 42  7 - 45 U/L Final   • Alkaline Phosphatase 12/26/2018 70  24 - 120 U/L Final   • Total Bilirubin 12/26/2018 1.2* 0.1 - 1.0 mg/dL Final   • eGFR Non African Amer 12/26/2018 90  >60 mL/min/1.73 Final   • Globulin 12/26/2018 3.0  gm/dL Final   • A/G Ratio 12/26/2018 1.4  1.1 - 2.5 g/dL Final   • BUN/Creatinine Ratio 12/26/2018 15.9  7.0 - 25.0 Final   • Anion Gap 12/26/2018 9.0  4.0 - 13.0 mmol/L Final   • WBC 12/26/2018 6.07  4.80 - 10.80 10*3/mm3 Final   • RBC 12/26/2018 4.05* 4.80 - 5.90 10*6/mm3 Final   • Hemoglobin 12/26/2018 14.9  14.0 - 18.0 g/dL Final   • Hematocrit 12/26/2018 40.0  40.0 - 52.0 % Final   • MCV 12/26/2018 98.8* 82.0 - 95.0 fL Final   • MCH 12/26/2018 36.8* 28.0 - 32.0 pg Final   • MCHC 12/26/2018 37.3* 33.0 - 36.0 g/dL Final   • RDW 12/26/2018 13.3  12.0 - 15.0 % Final   • RDW-SD 12/26/2018 47.6  40.0 - 54.0 fl Final   • MPV 12/26/2018 9.0  6.0 - 12.0 fL Final   • Platelets 12/26/2018 616* 130 - 400 10*3/mm3 Final   • Neutrophil % 12/26/2018 57.1  39.0 - 78.0 % Final   • Lymphocyte % 12/26/2018 31.3  15.0 - 45.0 % Final   •  Monocyte % 12/26/2018 9.4  4.0 - 12.0 % Final   • Eosinophil % 12/26/2018 0.7  0.0 - 4.0 % Final   • Basophil % 12/26/2018 1.0  0.0 - 2.0 % Final   • Neutrophils, Absolute 12/26/2018 3.47  1.87 - 8.40 10*3/mm3 Final   • Lymphocytes, Absolute 12/26/2018 1.90  0.72 - 4.86 10*3/mm3 Final   • Monocytes, Absolute 12/26/2018 0.57  0.19 - 1.30 10*3/mm3 Final   • Eosinophils, Absolute 12/26/2018 0.04  0.00 - 0.70 10*3/mm3 Final   • Basophils, Absolute 12/26/2018 0.06  0.00 - 0.20 10*3/mm3 Final       RADIOLOGY:  No results found.         Assessment/Plan  Devonte Lara is a 58 y.o. year old male with Essential Thrombocythemia on 100mg bid HU here for f/u and doing well.    Patient Active Problem List   Diagnosis   • Thrombocythemia (CMS/HCC)   • Essential thrombocytosis (CMS/HCC)   • Vitamin B12 deficiency          1. Essential Thrombocythemia: . On Hydroxyurea. Pt also on aspirin  --plt elevated to 616. Advised to take 2 pills at night and 1 pill in the morning (m,w,f) the rest of the week, he should take 1 pill bid.  --check cbc closer to home in 2 weeks.  rtc in  Months.    2. HTN: on valsartan          Antonio Fitzgerald MD    12/26/2018    9:24 AM

## 2019-01-24 ENCOUNTER — TELEPHONE (OUTPATIENT)
Dept: ONCOLOGY | Facility: CLINIC | Age: 59
End: 2019-01-24

## 2019-01-24 NOTE — TELEPHONE ENCOUNTER
Received call from patient, he was calling to check on his PLT count which was taken mid Jan, at [email or web address],   Patient chart reviewed no recent lab work noted in chart, informed patient, he will call [email or web address] tomorrow 1/25/19 and have them fax results to office for Dr Fitzgerald to review.

## 2019-03-25 DIAGNOSIS — D47.3 ESSENTIAL THROMBOCYTOSIS (HCC): Primary | ICD-10-CM

## 2019-03-26 ENCOUNTER — LAB (OUTPATIENT)
Dept: LAB | Facility: HOSPITAL | Age: 59
End: 2019-03-26

## 2019-03-26 ENCOUNTER — OFFICE VISIT (OUTPATIENT)
Dept: ONCOLOGY | Facility: CLINIC | Age: 59
End: 2019-03-26

## 2019-03-26 VITALS
WEIGHT: 278.8 LBS | DIASTOLIC BLOOD PRESSURE: 86 MMHG | BODY MASS INDEX: 37.76 KG/M2 | SYSTOLIC BLOOD PRESSURE: 142 MMHG | RESPIRATION RATE: 18 BRPM | HEIGHT: 72 IN | OXYGEN SATURATION: 96 % | HEART RATE: 101 BPM | TEMPERATURE: 97.6 F

## 2019-03-26 DIAGNOSIS — E53.8 B12 DEFICIENCY: ICD-10-CM

## 2019-03-26 DIAGNOSIS — D47.3 ESSENTIAL THROMBOCYTHEMIA (HCC): Primary | ICD-10-CM

## 2019-03-26 DIAGNOSIS — D47.3 THROMBOCYTHEMIA, ESSENTIAL (HCC): Primary | ICD-10-CM

## 2019-03-26 DIAGNOSIS — D75.839 THROMBOCYTHEMIA: Primary | ICD-10-CM

## 2019-03-26 DIAGNOSIS — R79.89 ELEVATED LIVER FUNCTION TESTS: Primary | ICD-10-CM

## 2019-03-26 DIAGNOSIS — D47.3 ESSENTIAL THROMBOCYTOSIS (HCC): ICD-10-CM

## 2019-03-26 LAB
ALBUMIN SERPL-MCNC: 4.5 G/DL (ref 3.5–5)
ALBUMIN/GLOB SERPL: 1.5 G/DL (ref 1.1–2.5)
ALP SERPL-CCNC: 76 U/L (ref 24–120)
ALT SERPL W P-5'-P-CCNC: 57 U/L (ref 0–54)
ANION GAP SERPL CALCULATED.3IONS-SCNC: 11 MMOL/L (ref 4–13)
ANISOCYTOSIS BLD QL: ABNORMAL
AST SERPL-CCNC: 59 U/L (ref 7–45)
BASOPHILS # BLD AUTO: 0.02 10*3/MM3 (ref 0–0.2)
BASOPHILS NFR BLD AUTO: 0.5 % (ref 0–2)
BILIRUB SERPL-MCNC: 1.3 MG/DL (ref 0.1–1)
BUN BLD-MCNC: 10 MG/DL (ref 5–21)
BUN/CREAT SERPL: 12.7 (ref 7–25)
CALCIUM SPEC-SCNC: 9 MG/DL (ref 8.4–10.4)
CHLORIDE SERPL-SCNC: 99 MMOL/L (ref 98–110)
CO2 SERPL-SCNC: 25 MMOL/L (ref 24–31)
CREAT BLD-MCNC: 0.79 MG/DL (ref 0.5–1.4)
DEPRECATED RDW RBC AUTO: 57.1 FL (ref 40–54)
EOSINOPHIL # BLD AUTO: 0.01 10*3/MM3 (ref 0–0.7)
EOSINOPHIL NFR BLD AUTO: 0.3 % (ref 0–4)
ERYTHROCYTE [DISTWIDTH] IN BLOOD BY AUTOMATED COUNT: 15.1 % (ref 12–15)
GFR SERPL CREATININE-BSD FRML MDRD: 101 ML/MIN/1.73
GIANT PLATELETS: ABNORMAL
GLOBULIN UR ELPH-MCNC: 3 GM/DL
GLUCOSE BLD-MCNC: 104 MG/DL (ref 70–100)
HCT VFR BLD AUTO: 37.7 % (ref 40–52)
HGB BLD-MCNC: 14 G/DL (ref 14–18)
HOLD SPECIMEN: NORMAL
HOLD SPECIMEN: NORMAL
LYMPHOCYTES # BLD AUTO: 1.58 10*3/MM3 (ref 0.72–4.86)
LYMPHOCYTES # BLD MANUAL: 1.33 10*3/MM3 (ref 0.72–4.86)
LYMPHOCYTES NFR BLD AUTO: 40.8 % (ref 15–45)
LYMPHOCYTES NFR BLD MANUAL: 34.3 % (ref 15–45)
LYMPHOCYTES NFR BLD MANUAL: 8.1 % (ref 4–12)
MACROCYTES BLD QL SMEAR: ABNORMAL
MCH RBC QN AUTO: 38.7 PG (ref 28–32)
MCHC RBC AUTO-ENTMCNC: 37.1 G/DL (ref 33–36)
MCV RBC AUTO: 104.1 FL (ref 82–95)
MONOCYTES # BLD AUTO: 0.31 10*3/MM3 (ref 0.19–1.3)
MONOCYTES # BLD AUTO: 0.35 10*3/MM3 (ref 0.19–1.3)
MONOCYTES NFR BLD AUTO: 9 % (ref 4–12)
NEUTROPHILS # BLD AUTO: 1.89 10*3/MM3 (ref 1.87–8.4)
NEUTROPHILS # BLD AUTO: 2.03 10*3/MM3 (ref 1.87–8.4)
NEUTROPHILS NFR BLD AUTO: 48.9 % (ref 39–78)
NEUTROPHILS NFR BLD MANUAL: 52.5 % (ref 39–78)
PLATELET # BLD AUTO: 226 10*3/MM3 (ref 130–400)
PMV BLD AUTO: 9.5 FL (ref 6–12)
POTASSIUM BLD-SCNC: 4 MMOL/L (ref 3.5–5.3)
PROT SERPL-MCNC: 7.5 G/DL (ref 6.3–8.7)
RBC # BLD AUTO: 3.62 10*6/MM3 (ref 4.8–5.9)
SODIUM BLD-SCNC: 135 MMOL/L (ref 135–145)
VARIANT LYMPHS NFR BLD MANUAL: 5.1 % (ref 0–5)
WBC MORPH BLD: NORMAL
WBC NRBC COR # BLD: 3.87 10*3/MM3 (ref 4.8–10.8)

## 2019-03-26 PROCEDURE — 80053 COMPREHEN METABOLIC PANEL: CPT

## 2019-03-26 PROCEDURE — 36415 COLL VENOUS BLD VENIPUNCTURE: CPT

## 2019-03-26 PROCEDURE — 85007 BL SMEAR W/DIFF WBC COUNT: CPT

## 2019-03-26 PROCEDURE — 85025 COMPLETE CBC W/AUTO DIFF WBC: CPT

## 2019-03-26 PROCEDURE — 99214 OFFICE O/P EST MOD 30 MIN: CPT | Performed by: INTERNAL MEDICINE

## 2019-03-26 NOTE — PROGRESS NOTES
[facility]  HEMATOLOGY & ONCOLOGY    Cancer Staging Information:  Cancer Staging  No matching staging information was found for the patient.      Subjective     VISIT DIAGNOSIS:   Encounter Diagnoses   Name Primary?   • Essential thrombocythemia (CMS/HCC) Yes   • B12 deficiency        REASON FOR VISIT:     Chief Complaint   Patient presents with   • Thrombocytopenia     He is here for f/u visit today and to review his lab work        HEMATOLOGY / ONCOLOGY HISTORY:    No history exists.           INTERVAL HISTORY  Patient ID: Devonte Lara is a 58 y.o. year old male with ET here for f/u.  3/26/19:  No issues. No rash, no mucositis. On 1500mg HU once a day .Denies missing dose His appetite is too good. Denies n/v/, rash mouth sore, sob, cp, dizziness, LEO, weight loss, night sweats.  -the rest of ros unremarkable.  -  Past Medical History:   Past Medical History:   Diagnosis Date   • Gout    • Hypertension      Past Surgical History:   Past Surgical History:   Procedure Laterality Date   • CYST REMOVAL      left neck     Social History:   Social History     Socioeconomic History   • Marital status:      Spouse name: Not on file   • Number of children: Not on file   • Years of education: Not on file   • Highest education level: Not on file   Tobacco Use   • Smoking status: Never Smoker   Substance and Sexual Activity   • Alcohol use: Yes   • Drug use: No   • Sexual activity: Yes     Partners: Female     Family History:   Family History   Problem Relation Age of Onset   • Diabetes Father    • Cancer Paternal Aunt    • Cancer Maternal Grandmother         Tumor Pituatary gland       Review of Systems   Constitutional: Negative.    HENT: Negative.    Eyes: Negative.    Respiratory: Negative.    Cardiovascular: Negative.    Gastrointestinal: Negative.    Endocrine: Negative.    Genitourinary: Negative.    Musculoskeletal: Negative.    Skin: Negative.    Neurological: Negative.    Hematological:  "Negative.    Psychiatric/Behavioral: Negative.         Performance Status:  Asymptomatic    Medications:    Current Outpatient Medications   Medication Sig Dispense Refill   • aspirin 81 MG chewable tablet Chew 81 mg Daily.     • hydroxyurea (HYDREA) 500 MG capsule TAKE ONE CAPSULE BY MOUTH THREE TIMES A DAY 90 capsule 3   • valsartan (DIOVAN) 160 MG tablet        No current facility-administered medications for this visit.        ALLERGIES:  No Known Allergies    Objective      Vitals:    03/26/19 1451   BP: 142/86   Pulse: 101   Resp: 18   Temp: 97.6 °F (36.4 °C)   TempSrc: Tympanic   SpO2: 96%   Weight: 126 kg (278 lb 12.8 oz)   Height: 182.9 cm (72\")   PainSc: 0-No pain         Current Status 3/26/2019   ECOG score 0         Physical Exam  remains the same  General Appearance: Patient is awake, alert, oriented and in no acute distress. Patient is welldeveloped, wellnourished, and appears stated age.  HEENT: Normocephalic. Sclerae clear, conjunctiva pink, extraocular movements intact, pupils, round, reactive to light and  accommodation. Mouth and throat are clear with moist oral mucosa.  NECK: Supple, no jugular venous distention, thyroid not enlarged.  LYMPH: No cervical, supraclavicular, axillary, or inguinal lymphadenopathy.  CHEST: Equal bilateral expansion, AP  diameter normal, resonant percussion note  LUNGS: Good air movement, no rales, rhonchi, rubs or wheezes with auscultation  CARDIO: Regular sinus rhythm, no murmurs, gallops or rubs.  ABDOMEN: obese abdomen. Nondistended, soft, No tenderness, no guarding, no rebound, No hepatosplenomegaly. No abdominal masses. Bowel sounds positive. No hernia  GENITALIA: Not examined.  BREASTS: Not examined.  MUSKEL: No joint swelling, decreased motion, or inflammation  EXTREMS: No edema, clubbing, cyanosis, No varicose veins.  NEURO: Grossly nonfocal, Gait is coordinated and smooth, Cognition is preserved.  SKIN: No rashes, no ecchymoses, no petechia.  PSYCH: Oriented " to time, place and person. Memory is preserved. Mood and affect appear normal  RECENT LABS:  Lab on 03/26/2019   Component Date Value Ref Range Status   • Glucose 03/26/2019 104* 70 - 100 mg/dL Final   • BUN 03/26/2019 10  5 - 21 mg/dL Final   • Creatinine 03/26/2019 0.79  0.50 - 1.40 mg/dL Final   • Sodium 03/26/2019 135  135 - 145 mmol/L Final   • Potassium 03/26/2019 4.0  3.5 - 5.3 mmol/L Final   • Chloride 03/26/2019 99  98 - 110 mmol/L Final   • CO2 03/26/2019 25.0  24.0 - 31.0 mmol/L Final   • Calcium 03/26/2019 9.0  8.4 - 10.4 mg/dL Final   • Total Protein 03/26/2019 7.5  6.3 - 8.7 g/dL Final   • Albumin 03/26/2019 4.50  3.50 - 5.00 g/dL Final   • ALT (SGPT) 03/26/2019 57* 0 - 54 U/L Final   • AST (SGOT) 03/26/2019 59* 7 - 45 U/L Final   • Alkaline Phosphatase 03/26/2019 76  24 - 120 U/L Final   • Total Bilirubin 03/26/2019 1.3* 0.1 - 1.0 mg/dL Final   • eGFR Non African Amer 03/26/2019 101  >60 mL/min/1.73 Final   • Globulin 03/26/2019 3.0  gm/dL Final   • A/G Ratio 03/26/2019 1.5  1.1 - 2.5 g/dL Final   • BUN/Creatinine Ratio 03/26/2019 12.7  7.0 - 25.0 Final   • Anion Gap 03/26/2019 11.0  4.0 - 13.0 mmol/L Final   • WBC 03/26/2019 3.87* 4.80 - 10.80 10*3/mm3 Final   • RBC 03/26/2019 3.62* 4.80 - 5.90 10*6/mm3 Final   • Hemoglobin 03/26/2019 14.0  14.0 - 18.0 g/dL Final   • Hematocrit 03/26/2019 37.7* 40.0 - 52.0 % Final   • MCV 03/26/2019 104.1* 82.0 - 95.0 fL Final   • MCH 03/26/2019 38.7* 28.0 - 32.0 pg Final   • MCHC 03/26/2019 37.1* 33.0 - 36.0 g/dL Final   • RDW 03/26/2019 15.1* 12.0 - 15.0 % Final   • RDW-SD 03/26/2019 57.1* 40.0 - 54.0 fl Final   • MPV 03/26/2019 9.5  6.0 - 12.0 fL Final   • Platelets 03/26/2019 226  130 - 400 10*3/mm3 Final   • Neutrophil % 03/26/2019 48.9  39.0 - 78.0 % Final   • Lymphocyte % 03/26/2019 40.8  15.0 - 45.0 % Final   • Monocyte % 03/26/2019 9.0  4.0 - 12.0 % Final   • Eosinophil % 03/26/2019 0.3  0.0 - 4.0 % Final   • Basophil % 03/26/2019 0.5  0.0 - 2.0 % Final   •  Neutrophils, Absolute 03/26/2019 1.89  1.87 - 8.40 10*3/mm3 Final   • Lymphocytes, Absolute 03/26/2019 1.58  0.72 - 4.86 10*3/mm3 Final   • Monocytes, Absolute 03/26/2019 0.35  0.19 - 1.30 10*3/mm3 Final   • Eosinophils, Absolute 03/26/2019 0.01  0.00 - 0.70 10*3/mm3 Final   • Basophils, Absolute 03/26/2019 0.02  0.00 - 0.20 10*3/mm3 Final   • Neutrophil % 03/26/2019 52.5  39.0 - 78.0 % Final   • Lymphocyte % 03/26/2019 34.3  15.0 - 45.0 % Final   • Monocyte % 03/26/2019 8.1  4.0 - 12.0 % Final   • Atypical Lymphocyte % 03/26/2019 5.1* 0.0 - 5.0 % Final   • Neutrophils Absolute 03/26/2019 2.03  1.87 - 8.40 10*3/mm3 Final   • Lymphocytes Absolute 03/26/2019 1.33  0.72 - 4.86 10*3/mm3 Final   • Monocytes Absolute 03/26/2019 0.31  0.19 - 1.30 10*3/mm3 Final   • Anisocytosis 03/26/2019 Slight/1+  None Seen Final   • Macrocytes 03/26/2019 Slight/1+  None Seen Final   • WBC Morphology 03/26/2019 Normal  Normal Final   • Giant Platelets 03/26/2019 Slight/1+  None Seen Final       RADIOLOGY:  No results found.         Assessment/Plan  Devonte Lara is a 58 y.o. year old male with Essential Thrombocythemia on 1500mg qd HU here for f/u and doing well.    Patient Active Problem List   Diagnosis   • Thrombocythemia (CMS/HCC)   • Essential thrombocytosis (CMS/HCC)   • Vitamin B12 deficiency          1. Essential Thrombocythemia: . On Hydroxyurea. Pt also on aspirin  --plt elevated to 616. Advised to take 2 pills at night and 1 pill in the morning (m,w,f) the rest of the week, he should take 1 pill bid.    --3/26/19: labs reviewed with pt, wbc 3.87, Hg 14. plt 226. He has been taking 3 pills qd(1500mg daily at the same time )  -lft elevated ALT 57, AST 59. Recheck cmp in a week to make maria ines it does not meagan to elevate. He denies hepatotoxic meds.  rtc in  Months.    2. HTN: on valsartan  3. B12 def: gets monthly B12 at work clinic        Antonio Fitzgerald MD    3/26/2019    3:32 PM

## 2019-04-30 ENCOUNTER — TELEPHONE (OUTPATIENT)
Dept: ONCOLOGY | Facility: CLINIC | Age: 59
End: 2019-04-30

## 2019-04-30 NOTE — TELEPHONE ENCOUNTER
"Per Dr Fitzgerald instructions, patient was called after she received a E-mail from requesting information regarding \"what he can take for knee pain, that will not interfere with his liver function and elevate his liver count\"  Per Dr Fitzgerald patient was informed he could use Roxicodone without fear of interference or elevation of liver function, he can address this issue with his PCP or Pain Management if he uses. He v/u  "

## 2019-06-10 RX ORDER — HYDROXYUREA 500 MG/1
CAPSULE ORAL
Qty: 90 CAPSULE | Refills: 2 | Status: SHIPPED | OUTPATIENT
Start: 2019-06-10 | End: 2019-09-07 | Stop reason: SDUPTHER

## 2019-06-25 DIAGNOSIS — D47.3 ESSENTIAL THROMBOCYTOSIS (HCC): Primary | ICD-10-CM

## 2019-06-26 ENCOUNTER — APPOINTMENT (OUTPATIENT)
Dept: LAB | Facility: HOSPITAL | Age: 59
End: 2019-06-26

## 2019-06-27 ENCOUNTER — OFFICE VISIT (OUTPATIENT)
Dept: ONCOLOGY | Facility: CLINIC | Age: 59
End: 2019-06-27

## 2019-06-27 ENCOUNTER — TELEPHONE (OUTPATIENT)
Dept: ONCOLOGY | Facility: CLINIC | Age: 59
End: 2019-06-27

## 2019-06-27 ENCOUNTER — APPOINTMENT (OUTPATIENT)
Dept: LAB | Facility: HOSPITAL | Age: 59
End: 2019-06-27

## 2019-06-27 VITALS
WEIGHT: 268.4 LBS | TEMPERATURE: 98.2 F | DIASTOLIC BLOOD PRESSURE: 82 MMHG | OXYGEN SATURATION: 97 % | HEIGHT: 72 IN | SYSTOLIC BLOOD PRESSURE: 132 MMHG | HEART RATE: 88 BPM | RESPIRATION RATE: 16 BRPM | BODY MASS INDEX: 36.35 KG/M2

## 2019-06-27 DIAGNOSIS — D47.3 ESSENTIAL THROMBOCYTOSIS (HCC): Primary | ICD-10-CM

## 2019-06-27 LAB
ALBUMIN SERPL-MCNC: 4.7 G/DL (ref 3.5–5)
ALBUMIN/GLOB SERPL: 1.8 G/DL (ref 1.1–2.5)
ALP SERPL-CCNC: 86 U/L (ref 24–120)
ALT SERPL W P-5'-P-CCNC: 66 U/L (ref 0–54)
ANION GAP SERPL CALCULATED.3IONS-SCNC: 11 MMOL/L (ref 4–13)
ANISOCYTOSIS BLD QL: NORMAL
AST SERPL-CCNC: 46 U/L (ref 7–45)
BASOPHILS # BLD AUTO: 0.02 10*3/MM3 (ref 0–0.2)
BASOPHILS NFR BLD AUTO: 0.6 % (ref 0–2)
BILIRUB SERPL-MCNC: 1.3 MG/DL (ref 0.1–1)
BUN BLD-MCNC: 12 MG/DL (ref 5–21)
BUN/CREAT SERPL: 14.3 (ref 7–25)
CALCIUM SPEC-SCNC: 8.8 MG/DL (ref 8.4–10.4)
CHLORIDE SERPL-SCNC: 105 MMOL/L (ref 98–110)
CO2 SERPL-SCNC: 22 MMOL/L (ref 24–31)
CREAT BLD-MCNC: 0.84 MG/DL (ref 0.5–1.4)
DEPRECATED RDW RBC AUTO: 61.2 FL (ref 40–54)
EOSINOPHIL # BLD AUTO: 0.01 10*3/MM3 (ref 0–0.7)
EOSINOPHIL NFR BLD AUTO: 0.3 % (ref 0–4)
ERYTHROCYTE [DISTWIDTH] IN BLOOD BY AUTOMATED COUNT: 14.7 % (ref 12–15)
FERRITIN SERPL-MCNC: 299 NG/ML (ref 17.9–464)
FOLATE SERPL-MCNC: 8.21 NG/ML (ref 4.78–24.2)
GFR SERPL CREATININE-BSD FRML MDRD: 95 ML/MIN/1.73
GLOBULIN UR ELPH-MCNC: 2.6 GM/DL
GLUCOSE BLD-MCNC: 116 MG/DL (ref 70–100)
HCT VFR BLD AUTO: 34 % (ref 40–52)
HGB BLD-MCNC: 12.8 G/DL (ref 14–18)
HOLD SPECIMEN: NORMAL
HOLD SPECIMEN: NORMAL
IMM GRANULOCYTES # BLD AUTO: 0.01 10*3/MM3 (ref 0–0.05)
IMM GRANULOCYTES NFR BLD AUTO: 0.3 % (ref 0–5)
IRON 24H UR-MRATE: 155 MCG/DL (ref 42–180)
IRON SATN MFR SERPL: 43 % (ref 20–45)
LYMPHOCYTES # BLD AUTO: 1.55 10*3/MM3 (ref 0.72–4.86)
LYMPHOCYTES NFR BLD AUTO: 46.7 % (ref 15–45)
MACROCYTES BLD QL SMEAR: NORMAL
MCH RBC QN AUTO: 43 PG (ref 28–32)
MCHC RBC AUTO-ENTMCNC: 37.6 G/DL (ref 33–36)
MCV RBC AUTO: 114.1 FL (ref 82–95)
MONOCYTES # BLD AUTO: 0.33 10*3/MM3 (ref 0.19–1.3)
MONOCYTES NFR BLD AUTO: 9.9 % (ref 4–12)
NEUTROPHILS # BLD AUTO: 1.4 10*3/MM3 (ref 1.87–8.4)
NEUTROPHILS NFR BLD AUTO: 42.2 % (ref 39–78)
NRBC BLD AUTO-RTO: 0 /100 WBC (ref 0–0.2)
PLATELET # BLD AUTO: 164 10*3/MM3 (ref 130–400)
PMV BLD AUTO: 9.7 FL (ref 6–12)
POLYCHROMASIA BLD QL SMEAR: NORMAL
POTASSIUM BLD-SCNC: 3.8 MMOL/L (ref 3.5–5.3)
PROT SERPL-MCNC: 7.3 G/DL (ref 6.3–8.7)
RBC # BLD AUTO: 2.98 10*6/MM3 (ref 4.8–5.9)
SMALL PLATELETS BLD QL SMEAR: ADEQUATE
SODIUM BLD-SCNC: 138 MMOL/L (ref 135–145)
TIBC SERPL-MCNC: 360 MCG/DL (ref 225–420)
VIT B12 BLD-MCNC: 210 PG/ML (ref 239–931)
WBC MORPH BLD: NORMAL
WBC NRBC COR # BLD: 3.32 10*3/MM3 (ref 4.8–10.8)

## 2019-06-27 PROCEDURE — 83540 ASSAY OF IRON: CPT | Performed by: INTERNAL MEDICINE

## 2019-06-27 PROCEDURE — 85007 BL SMEAR W/DIFF WBC COUNT: CPT | Performed by: INTERNAL MEDICINE

## 2019-06-27 PROCEDURE — 99214 OFFICE O/P EST MOD 30 MIN: CPT | Performed by: INTERNAL MEDICINE

## 2019-06-27 PROCEDURE — 36415 COLL VENOUS BLD VENIPUNCTURE: CPT | Performed by: INTERNAL MEDICINE

## 2019-06-27 PROCEDURE — 83550 IRON BINDING TEST: CPT | Performed by: INTERNAL MEDICINE

## 2019-06-27 PROCEDURE — 82746 ASSAY OF FOLIC ACID SERUM: CPT | Performed by: INTERNAL MEDICINE

## 2019-06-27 PROCEDURE — 85025 COMPLETE CBC W/AUTO DIFF WBC: CPT | Performed by: INTERNAL MEDICINE

## 2019-06-27 PROCEDURE — 80053 COMPREHEN METABOLIC PANEL: CPT | Performed by: INTERNAL MEDICINE

## 2019-06-27 PROCEDURE — 82607 VITAMIN B-12: CPT | Performed by: INTERNAL MEDICINE

## 2019-06-27 PROCEDURE — 82728 ASSAY OF FERRITIN: CPT | Performed by: INTERNAL MEDICINE

## 2019-06-27 RX ORDER — LOSARTAN POTASSIUM 50 MG/1
TABLET ORAL
COMMUNITY
Start: 2019-06-03

## 2019-06-27 NOTE — TELEPHONE ENCOUNTER
Called patient to see if he has been getting B12 injections.  He gets them where he works.  Henok stated that it is due and he will get one whenhe goes back to work.

## 2019-06-27 NOTE — PROGRESS NOTES
[facility]  HEMATOLOGY & ONCOLOGY    Cancer Staging Information:  Cancer Staging  No matching staging information was found for the patient.      Subjective     VISIT DIAGNOSIS:   No diagnosis found.    REASON FOR VISIT:     Chief Complaint   Patient presents with   • Essential Thrombocytosis     Here for follwoup        HEMATOLOGY / ONCOLOGY HISTORY:    No history exists.           INTERVAL HISTORY  Patient ID: Devonte Lara is a 58 y.o. year old male with ET here for f/u.  6/27/19:  No issues. No rash, no mucositis. On 1500mg HU once a day .Denies missing dose His appetite is too good. He lost 10lbs intentionally. Denies n/v/, rash mouth sore, sob, cp, dizziness, LEO, night sweats.  -the rest of ros unremarkable.  -  Past Medical History:   Past Medical History:   Diagnosis Date   • Gout    • Hypertension      Past Surgical History:   Past Surgical History:   Procedure Laterality Date   • CYST REMOVAL      left neck     Social History:   Social History     Socioeconomic History   • Marital status:      Spouse name: Not on file   • Number of children: Not on file   • Years of education: Not on file   • Highest education level: Not on file   Tobacco Use   • Smoking status: Never Smoker   Substance and Sexual Activity   • Alcohol use: Yes   • Drug use: No   • Sexual activity: Yes     Partners: Female     Family History:   Family History   Problem Relation Age of Onset   • Diabetes Father    • Cancer Paternal Aunt    • Cancer Maternal Grandmother         Tumor Pituatary gland       Review of Systems   Constitutional: Negative.    HENT: Negative.    Eyes: Negative.    Respiratory: Negative.    Cardiovascular: Negative.    Gastrointestinal: Negative.    Endocrine: Negative.    Genitourinary: Negative.    Musculoskeletal: Negative.    Skin: Negative.    Neurological: Negative.    Hematological: Negative.    Psychiatric/Behavioral: Negative.         Performance  "Status:  Asymptomatic    Medications:    Current Outpatient Medications   Medication Sig Dispense Refill   • aspirin 81 MG chewable tablet Chew 81 mg Daily.     • hydroxyurea (HYDREA) 500 MG capsule TAKE ONE CAPSULE BY MOUTH THREE TIMES A DAY 90 capsule 2   • valsartan (DIOVAN) 160 MG tablet      • losartan (COZAAR) 50 MG tablet        No current facility-administered medications for this visit.        ALLERGIES:  No Known Allergies    Objective      Vitals:    06/27/19 0956   BP: 132/82   Pulse: 88   Resp: 16   Temp: 98.2 °F (36.8 °C)   TempSrc: Oral   SpO2: 97%   Weight: 122 kg (268 lb 6.4 oz)   Height: 182.9 cm (72\")   PainSc: 0-No pain         Current Status 6/27/2019   ECOG score 0         Physical Examremains the same  General Appearance: Patient is awake, alert, oriented and in no acute distress. Patient is welldeveloped, wellnourished, and appears stated age.  HEENT: Normocephalic. Sclerae clear, conjunctiva pink, extraocular movements intact, pupils, round, reactive to light and  accommodation. Mouth and throat are clear with moist oral mucosa.  NECK: Supple, no jugular venous distention, thyroid not enlarged.  LYMPH: No cervical, supraclavicular, axillary, or inguinal lymphadenopathy.  CHEST: Equal bilateral expansion, AP  diameter normal, resonant percussion note  LUNGS: Good air movement, no rales, rhonchi, rubs or wheezes with auscultation  CARDIO: Regular sinus rhythm, no murmurs, gallops or rubs.  ABDOMEN: obese abdomen. Nondistended, soft, No tenderness, no guarding, no rebound, No hepatosplenomegaly. No abdominal masses. Bowel sounds positive. No hernia  GENITALIA: Not examined.  BREASTS: Not examined.  MUSKEL: No joint swelling, decreased motion, or inflammation  EXTREMS: No edema, clubbing, cyanosis, No varicose veins.  NEURO: Grossly nonfocal, Gait is coordinated and smooth, Cognition is preserved.  SKIN: No rashes, no ecchymoses, no petechia.  PSYCH: Oriented to time, place and person. Memory is " preserved. Mood and affect appear normal  RECENT LABS:  Orders Only on 06/25/2019   Component Date Value Ref Range Status   • Glucose 06/27/2019 116* 70 - 100 mg/dL Final   • BUN 06/27/2019 12  5 - 21 mg/dL Final   • Creatinine 06/27/2019 0.84  0.50 - 1.40 mg/dL Final   • Sodium 06/27/2019 138  135 - 145 mmol/L Final   • Potassium 06/27/2019 3.8  3.5 - 5.3 mmol/L Final   • Chloride 06/27/2019 105  98 - 110 mmol/L Final   • CO2 06/27/2019 22.0* 24.0 - 31.0 mmol/L Final   • Calcium 06/27/2019 8.8  8.4 - 10.4 mg/dL Final   • Total Protein 06/27/2019 7.3  6.3 - 8.7 g/dL Final   • Albumin 06/27/2019 4.70  3.50 - 5.00 g/dL Final   • ALT (SGPT) 06/27/2019 66* 0 - 54 U/L Final   • AST (SGOT) 06/27/2019 46* 7 - 45 U/L Final   • Alkaline Phosphatase 06/27/2019 86  24 - 120 U/L Final   • Total Bilirubin 06/27/2019 1.3* 0.1 - 1.0 mg/dL Final   • eGFR Non African Amer 06/27/2019 95  >60 mL/min/1.73 Final   • Globulin 06/27/2019 2.6  gm/dL Final   • A/G Ratio 06/27/2019 1.8  1.1 - 2.5 g/dL Final   • BUN/Creatinine Ratio 06/27/2019 14.3  7.0 - 25.0 Final   • Anion Gap 06/27/2019 11.0  4.0 - 13.0 mmol/L Final       RADIOLOGY:  No results found.         Assessment/Plan  Devonte Lara is a 58 y.o. year old male with Essential Thrombocythemia on 1500mg qd HU here for f/u and doing well.    Patient Active Problem List   Diagnosis   • Thrombocythemia (CMS/HCC)   • Essential thrombocytosis (CMS/HCC)   • Vitamin B12 deficiency          1. Essential Thrombocythemia: . On Hydroxyurea. Pt also on aspirin  --plt elevated to 616. Advised to take 2 pills at night and 1 pill in the morning (m,w,f) the rest of the week, he should take 1 pill bid.    --3/26/19: labs reviewed with pt, wbc 32, hg 12.8, plt 164. He has been taking 3 pills qd(1500mg daily at the same time ). He has lost 10lbs, perhaps pharmocokinetics changed.  -advice to take 2 pills 5 days a week and 3 pill on weekend.  -check iron profile, ferritin b12, folate and act  accordingly  -lft elevated ALT 57, AST 59. Stable. 2/2 fatty liver He denies hepatotoxic meds.      2. HTN: on valsartan  3. B12 def: gets monthly B12 at work clinic  4. Fatty liver: CT abdomen 1/2017 showed fatty infiltration with borderline splenomegaly spleen measures 13.4cm.    rtc in 3 Months.      Antonio Fitzgerald MD    6/27/2019    10:12 AM

## 2019-06-27 NOTE — TELEPHONE ENCOUNTER
----- Message from Antonio Fitzgerald MD sent at 6/27/2019 12:06 PM CDT -----  Please call the patient regarding his abnormal result. Is he on monthly B12? If not, we need to start weekly then transition to monthly.tnx

## 2019-09-09 RX ORDER — HYDROXYUREA 500 MG/1
CAPSULE ORAL
Qty: 90 CAPSULE | Refills: 1 | Status: SHIPPED | OUTPATIENT
Start: 2019-09-09 | End: 2019-11-11 | Stop reason: SDUPTHER

## 2019-09-27 DIAGNOSIS — D47.3 ESSENTIAL THROMBOCYTOSIS (HCC): Primary | ICD-10-CM

## 2019-09-30 ENCOUNTER — OFFICE VISIT (OUTPATIENT)
Dept: ONCOLOGY | Facility: CLINIC | Age: 59
End: 2019-09-30

## 2019-09-30 ENCOUNTER — LAB (OUTPATIENT)
Dept: LAB | Facility: HOSPITAL | Age: 59
End: 2019-09-30

## 2019-09-30 VITALS
DIASTOLIC BLOOD PRESSURE: 82 MMHG | OXYGEN SATURATION: 99 % | BODY MASS INDEX: 35.7 KG/M2 | WEIGHT: 263.6 LBS | HEART RATE: 88 BPM | TEMPERATURE: 97.4 F | SYSTOLIC BLOOD PRESSURE: 138 MMHG | HEIGHT: 72 IN | RESPIRATION RATE: 16 BRPM

## 2019-09-30 DIAGNOSIS — D64.9 ANEMIA, UNSPECIFIED TYPE: Primary | ICD-10-CM

## 2019-09-30 DIAGNOSIS — D64.9 ANEMIA, UNSPECIFIED TYPE: ICD-10-CM

## 2019-09-30 DIAGNOSIS — D47.3 ESSENTIAL THROMBOCYTOSIS (HCC): Primary | ICD-10-CM

## 2019-09-30 DIAGNOSIS — R17 ELEVATED BILIRUBIN: ICD-10-CM

## 2019-09-30 LAB
ALBUMIN SERPL-MCNC: 4.2 G/DL (ref 3.5–5.2)
ALBUMIN/GLOB SERPL: 1.8 G/DL
ALP SERPL-CCNC: 67 U/L (ref 39–117)
ALT SERPL W P-5'-P-CCNC: 56 U/L (ref 1–41)
ANION GAP SERPL CALCULATED.3IONS-SCNC: 11 MMOL/L (ref 5–15)
ANISOCYTOSIS BLD QL: ABNORMAL
AST SERPL-CCNC: 61 U/L (ref 1–40)
BILIRUB SERPL-MCNC: 1.4 MG/DL (ref 0.2–1.2)
BUN BLD-MCNC: 10 MG/DL (ref 6–20)
BUN/CREAT SERPL: 12.8 (ref 7–25)
CALCIUM SPEC-SCNC: 8.3 MG/DL (ref 8.6–10.5)
CHLORIDE SERPL-SCNC: 100 MMOL/L (ref 98–107)
CO2 SERPL-SCNC: 26 MMOL/L (ref 22–29)
CREAT BLD-MCNC: 0.78 MG/DL (ref 0.76–1.27)
DEPRECATED RDW RBC AUTO: 55.2 FL (ref 37–54)
ERYTHROCYTE [DISTWIDTH] IN BLOOD BY AUTOMATED COUNT: 13 % (ref 12.3–15.4)
FERRITIN SERPL-MCNC: 471.7 NG/ML (ref 30–400)
GFR SERPL CREATININE-BSD FRML MDRD: 103 ML/MIN/1.73
GIANT PLATELETS: ABNORMAL
GLOBULIN UR ELPH-MCNC: 2.3 GM/DL
GLUCOSE BLD-MCNC: 144 MG/DL (ref 65–99)
HCT VFR BLD AUTO: 33 % (ref 37.5–51)
HGB BLD-MCNC: 12.3 G/DL (ref 13–17.7)
HOLD SPECIMEN: NORMAL
IRON 24H UR-MRATE: 73 MCG/DL (ref 59–158)
IRON SATN MFR SERPL: 20 % (ref 20–50)
LYMPHOCYTES # BLD MANUAL: 0.3 10*3/MM3 (ref 0.7–3.1)
LYMPHOCYTES NFR BLD MANUAL: 13.1 % (ref 19.6–45.3)
LYMPHOCYTES NFR BLD MANUAL: 5.1 % (ref 5–12)
MACROCYTES BLD QL SMEAR: ABNORMAL
MCH RBC QN AUTO: 43 PG (ref 26.6–33)
MCHC RBC AUTO-ENTMCNC: 37.3 G/DL (ref 31.5–35.7)
MCV RBC AUTO: 115.4 FL (ref 79–97)
MICROCYTES BLD QL: ABNORMAL
MONOCYTES # BLD AUTO: 0.12 10*3/MM3 (ref 0.1–0.9)
NEUTROPHILS # BLD AUTO: 1.79 10*3/MM3 (ref 1.7–7)
NEUTROPHILS NFR BLD MANUAL: 76.8 % (ref 42.7–76)
NEUTS BAND NFR BLD MANUAL: 1 % (ref 0–5)
PLATELET # BLD AUTO: 100 10*3/MM3 (ref 140–450)
PMV BLD AUTO: 9.7 FL (ref 6–12)
POIKILOCYTOSIS BLD QL SMEAR: ABNORMAL
POLYCHROMASIA BLD QL SMEAR: ABNORMAL
POTASSIUM BLD-SCNC: 3.4 MMOL/L (ref 3.5–5.2)
PROT SERPL-MCNC: 6.5 G/DL (ref 6–8.5)
RBC # BLD AUTO: 2.86 10*6/MM3 (ref 4.14–5.8)
SMALL PLATELETS BLD QL SMEAR: ABNORMAL
SODIUM BLD-SCNC: 137 MMOL/L (ref 136–145)
TIBC SERPL-MCNC: 364 MCG/DL (ref 298–536)
TRANSFERRIN SERPL-MCNC: 244 MG/DL (ref 200–360)
VARIANT LYMPHS NFR BLD MANUAL: 4 % (ref 0–5)
WBC MORPH BLD: NORMAL
WBC NRBC COR # BLD: 2.3 10*3/MM3 (ref 3.4–10.8)

## 2019-09-30 PROCEDURE — 82607 VITAMIN B-12: CPT

## 2019-09-30 PROCEDURE — 80053 COMPREHEN METABOLIC PANEL: CPT | Performed by: INTERNAL MEDICINE

## 2019-09-30 PROCEDURE — 82746 ASSAY OF FOLIC ACID SERUM: CPT

## 2019-09-30 PROCEDURE — 82728 ASSAY OF FERRITIN: CPT | Performed by: INTERNAL MEDICINE

## 2019-09-30 PROCEDURE — 99214 OFFICE O/P EST MOD 30 MIN: CPT | Performed by: INTERNAL MEDICINE

## 2019-09-30 PROCEDURE — 83010 ASSAY OF HAPTOGLOBIN QUANT: CPT

## 2019-09-30 PROCEDURE — 85007 BL SMEAR W/DIFF WBC COUNT: CPT | Performed by: INTERNAL MEDICINE

## 2019-09-30 PROCEDURE — 85025 COMPLETE CBC W/AUTO DIFF WBC: CPT | Performed by: INTERNAL MEDICINE

## 2019-09-30 PROCEDURE — 84466 ASSAY OF TRANSFERRIN: CPT | Performed by: INTERNAL MEDICINE

## 2019-09-30 PROCEDURE — 83540 ASSAY OF IRON: CPT | Performed by: INTERNAL MEDICINE

## 2019-09-30 PROCEDURE — 36415 COLL VENOUS BLD VENIPUNCTURE: CPT | Performed by: INTERNAL MEDICINE

## 2019-09-30 NOTE — PROGRESS NOTES
[facility]  HEMATOLOGY & ONCOLOGY    Cancer Staging Information:  Cancer Staging  No matching staging information was found for the patient.      Subjective     VISIT DIAGNOSIS:   Encounter Diagnoses   Name Primary?   • Anemia, unspecified type Yes   • Elevated bilirubin        REASON FOR VISIT:     Chief Complaint   Patient presents with   • Thrombocytosis     Here for followup        HEMATOLOGY / ONCOLOGY HISTORY:    No history exists.           INTERVAL HISTORY  Patient ID: Devonte Lara is a 59 y.o. year old male with ET here for f/u.  9/30/19:  No issues. No rash, no mucositis. On 2 tabs on week days then 3 tabs on weekend. Tolerates very well.    He lost 10-15lbs intentionally. Denies n/v/, rash mouth sore, sob, cp, dizziness, LEO, night sweats.  -the rest of ros unremarkable.  -  Past Medical History:   Past Medical History:   Diagnosis Date   • Gout    • Hypertension      Past Surgical History:   Past Surgical History:   Procedure Laterality Date   • CYST REMOVAL      left neck     Social History:   Social History     Socioeconomic History   • Marital status:      Spouse name: Not on file   • Number of children: Not on file   • Years of education: Not on file   • Highest education level: Not on file   Tobacco Use   • Smoking status: Never Smoker   Substance and Sexual Activity   • Alcohol use: Yes   • Drug use: No   • Sexual activity: Yes     Partners: Female     Family History:   Family History   Problem Relation Age of Onset   • Diabetes Father    • Cancer Paternal Aunt    • Cancer Maternal Grandmother         Tumor Pituatary gland       Review of Systems   Constitutional: Negative.    HENT: Negative.    Eyes: Negative.    Respiratory: Negative.    Cardiovascular: Negative.    Gastrointestinal: Negative.    Endocrine: Negative.    Genitourinary: Negative.    Musculoskeletal: Negative.    Skin: Negative.    Neurological: Negative.    Hematological: Negative.   "  Psychiatric/Behavioral: Negative.         Performance Status:  Asymptomatic    Medications:    Current Outpatient Medications   Medication Sig Dispense Refill   • aspirin 81 MG chewable tablet Chew 81 mg Daily.     • hydroxyurea (HYDREA) 500 MG capsule TAKE ONE CAPSULE BY MOUTH THREE TIMES A DAY 90 capsule 1   • losartan (COZAAR) 50 MG tablet      • valsartan (DIOVAN) 160 MG tablet        No current facility-administered medications for this visit.        ALLERGIES:  No Known Allergies    Objective      Vitals:    09/30/19 1419   BP: 138/82   Pulse: 88   Resp: 16   Temp: 97.4 °F (36.3 °C)   TempSrc: Temporal   SpO2: 99%   Weight: 120 kg (263 lb 9.6 oz)   Height: 182.9 cm (72\")   PainSc: 0-No pain         Current Status 9/30/2019   ECOG score 0         Physical Examremains the same  General Appearance: Patient is awake, alert, oriented and in no acute distress. Patient is welldeveloped, wellnourished, and appears stated age.  HEENT: Normocephalic. Sclerae clear, conjunctiva pink, extraocular movements intact, pupils, round, reactive to light and  accommodation. Mouth and throat are clear with moist oral mucosa.  NECK: Supple, no jugular venous distention, thyroid not enlarged.  LYMPH: No cervical, supraclavicular, axillary, or inguinal lymphadenopathy.  CHEST: Equal bilateral expansion, AP  diameter normal, resonant percussion note  LUNGS: Good air movement, no rales, rhonchi, rubs or wheezes with auscultation  CARDIO: Regular sinus rhythm, no murmurs, gallops or rubs.  ABDOMEN: obese abdomen. Nondistended, soft, No tenderness, no guarding, no rebound, No hepatosplenomegaly. No abdominal masses. Bowel sounds positive. No hernia  GENITALIA: Not examined.  BREASTS: Not examined.  MUSKEL: No joint swelling, decreased motion, or inflammation  EXTREMS: No edema, clubbing, cyanosis, No varicose veins.  NEURO: Grossly nonfocal, Gait is coordinated and smooth, Cognition is preserved.  SKIN: No rashes, no ecchymoses, no " petechia.  PSYCH: Oriented to time, place and person. Memory is preserved. Mood and affect appear normal  RECENT LABS:  Orders Only on 09/27/2019   Component Date Value Ref Range Status   • Glucose 09/30/2019 144* 65 - 99 mg/dL Final   • BUN 09/30/2019 10  6 - 20 mg/dL Final   • Creatinine 09/30/2019 0.78  0.76 - 1.27 mg/dL Final   • Sodium 09/30/2019 137  136 - 145 mmol/L Final   • Potassium 09/30/2019 3.4* 3.5 - 5.2 mmol/L Final   • Chloride 09/30/2019 100  98 - 107 mmol/L Final   • CO2 09/30/2019 26.0  22.0 - 29.0 mmol/L Final   • Calcium 09/30/2019 8.3* 8.6 - 10.5 mg/dL Final   • Total Protein 09/30/2019 6.5  6.0 - 8.5 g/dL Final   • Albumin 09/30/2019 4.20  3.50 - 5.20 g/dL Final   • ALT (SGPT) 09/30/2019 56* 1 - 41 U/L Final   • AST (SGOT) 09/30/2019 61* 1 - 40 U/L Final   • Alkaline Phosphatase 09/30/2019 67  39 - 117 U/L Final   • Total Bilirubin 09/30/2019 1.4* 0.2 - 1.2 mg/dL Final   • eGFR Non African Amer 09/30/2019 103  >60 mL/min/1.73 Final   • Globulin 09/30/2019 2.3  gm/dL Final   • A/G Ratio 09/30/2019 1.8  g/dL Final   • BUN/Creatinine Ratio 09/30/2019 12.8  7.0 - 25.0 Final   • Anion Gap 09/30/2019 11.0  5.0 - 15.0 mmol/L Final   • WBC 09/30/2019 2.30* 3.40 - 10.80 10*3/mm3 Final   • RBC 09/30/2019 2.86* 4.14 - 5.80 10*6/mm3 Final   • Hemoglobin 09/30/2019 12.3* 13.0 - 17.7 g/dL Final   • Hematocrit 09/30/2019 33.0* 37.5 - 51.0 % Final   • MCV 09/30/2019 115.4* 79.0 - 97.0 fL Final   • MCH 09/30/2019 43.0* 26.6 - 33.0 pg Final   • MCHC 09/30/2019 37.3* 31.5 - 35.7 g/dL Final   • RDW 09/30/2019 13.0  12.3 - 15.4 % Final   • RDW-SD 09/30/2019 55.2* 37.0 - 54.0 fl Final   • MPV 09/30/2019 9.7  6.0 - 12.0 fL Final   • Platelets 09/30/2019 100* 140 - 450 10*3/mm3 Final       RADIOLOGY:  No results found.         Assessment/Plan  Devonte Lara is a 59 y.o. year old male with Essential Thrombocythemia on 1500mg qd HU here for f/u and doing well.    Patient Active Problem List   Diagnosis   •  Thrombocythemia (CMS/HCC)   • Essential thrombocytosis (CMS/HCC)   • Vitamin B12 deficiency          1. Essential Thrombocythemia: .    He has lost 10lbs, perhaps pharmocokinetics changed.  -advice to take 2 pills 5 days a week and 3 pill on weekend  --9/30/19: labs reviewed with pt, wbc 2.3, hg 12.3, plt 100.  -labs reviewed with pt, blood counts low as outlined above. Advised to go down on his dosage to 1000mg(2tabs) the whole week.    -check iron profile, ferritin b12, folate and act accordingly  -lft elevated ALT 56, AST 61. Stable. 2/2 fatty liver He denies hepatotoxic meds.      2. HTN: on valsartan  3. B12 def: gets monthly B12 at work clinic  4. Fatty liver: CT abdomen 1/2017 showed fatty infiltration with borderline splenomegaly spleen measures 13.4cm.    rtc in 3 Months.      Antonio Fitzgerald MD    9/30/2019    3:01 PM

## 2019-10-01 ENCOUNTER — TELEPHONE (OUTPATIENT)
Dept: ONCOLOGY | Facility: CLINIC | Age: 59
End: 2019-10-01

## 2019-10-01 LAB
FOLATE SERPL-MCNC: 5.79 NG/ML (ref 4.78–24.2)
HAPTOGLOB SERPL-MCNC: 109 MG/DL (ref 30–200)
VIT B12 BLD-MCNC: 256 PG/ML (ref 211–946)

## 2019-10-01 NOTE — TELEPHONE ENCOUNTER
----- Message from Antonio Fitzgerald MD sent at 10/1/2019  9:06 AM CDT -----  Please contact patient with result. Normal. Is he getting B12 shots monthly. He is supposed to. Please confirm. tnx.

## 2019-10-01 NOTE — PROGRESS NOTES
Please contact patient with result. Normal. Is he getting B12 shots monthly. He is supposed to. Please confirm. tnx.

## 2019-11-11 RX ORDER — HYDROXYUREA 500 MG/1
CAPSULE ORAL
Qty: 90 CAPSULE | Refills: 5 | Status: SHIPPED | OUTPATIENT
Start: 2019-11-11

## 2020-01-03 DIAGNOSIS — D64.9 ANEMIA, UNSPECIFIED TYPE: Primary | ICD-10-CM

## 2020-01-06 ENCOUNTER — OFFICE VISIT (OUTPATIENT)
Dept: ONCOLOGY | Facility: CLINIC | Age: 60
End: 2020-01-06

## 2020-01-06 ENCOUNTER — APPOINTMENT (OUTPATIENT)
Dept: LAB | Facility: HOSPITAL | Age: 60
End: 2020-01-06

## 2020-01-06 VITALS
SYSTOLIC BLOOD PRESSURE: 146 MMHG | BODY MASS INDEX: 37.72 KG/M2 | HEIGHT: 72 IN | RESPIRATION RATE: 16 BRPM | HEART RATE: 80 BPM | WEIGHT: 278.5 LBS | TEMPERATURE: 97.7 F | OXYGEN SATURATION: 98 % | DIASTOLIC BLOOD PRESSURE: 88 MMHG

## 2020-01-06 DIAGNOSIS — D47.3 THROMBOCYTHEMIA, ESSENTIAL (HCC): Primary | ICD-10-CM

## 2020-01-06 DIAGNOSIS — D64.9 ANEMIA, UNSPECIFIED TYPE: Primary | ICD-10-CM

## 2020-01-06 LAB
ALBUMIN SERPL-MCNC: 4.5 G/DL (ref 3.5–5.2)
ALBUMIN/GLOB SERPL: 1.7 G/DL
ALP SERPL-CCNC: 85 U/L (ref 39–117)
ALT SERPL W P-5'-P-CCNC: 33 U/L (ref 1–41)
ANION GAP SERPL CALCULATED.3IONS-SCNC: 14 MMOL/L (ref 5–15)
AST SERPL-CCNC: 22 U/L (ref 1–40)
BASOPHILS # BLD AUTO: 0.05 10*3/MM3 (ref 0–0.2)
BASOPHILS NFR BLD AUTO: 0.7 % (ref 0–1.5)
BILIRUB SERPL-MCNC: 0.5 MG/DL (ref 0.2–1.2)
BUN BLD-MCNC: 9 MG/DL (ref 6–20)
BUN/CREAT SERPL: 12.2 (ref 7–25)
CALCIUM SPEC-SCNC: 9.2 MG/DL (ref 8.6–10.5)
CHLORIDE SERPL-SCNC: 100 MMOL/L (ref 98–107)
CO2 SERPL-SCNC: 25 MMOL/L (ref 22–29)
CREAT BLD-MCNC: 0.74 MG/DL (ref 0.76–1.27)
DEPRECATED RDW RBC AUTO: 47.3 FL (ref 37–54)
EOSINOPHIL # BLD AUTO: 0.09 10*3/MM3 (ref 0–0.4)
EOSINOPHIL NFR BLD AUTO: 1.3 % (ref 0.3–6.2)
ERYTHROCYTE [DISTWIDTH] IN BLOOD BY AUTOMATED COUNT: 12 % (ref 12.3–15.4)
FERRITIN SERPL-MCNC: 386.3 NG/ML (ref 30–400)
GFR SERPL CREATININE-BSD FRML MDRD: 109 ML/MIN/1.73
GLOBULIN UR ELPH-MCNC: 2.7 GM/DL
GLUCOSE BLD-MCNC: 107 MG/DL (ref 65–99)
HCT VFR BLD AUTO: 38 % (ref 37.5–51)
HGB BLD-MCNC: 13.8 G/DL (ref 13–17.7)
HOLD SPECIMEN: NORMAL
HOLD SPECIMEN: NORMAL
IRON 24H UR-MRATE: 74 MCG/DL (ref 59–158)
IRON SATN MFR SERPL: 18 % (ref 20–50)
LYMPHOCYTES # BLD AUTO: 1.98 10*3/MM3 (ref 0.7–3.1)
LYMPHOCYTES NFR BLD AUTO: 29.4 % (ref 19.6–45.3)
MCH RBC QN AUTO: 38.5 PG (ref 26.6–33)
MCHC RBC AUTO-ENTMCNC: 36.3 G/DL (ref 31.5–35.7)
MCV RBC AUTO: 106.1 FL (ref 79–97)
MONOCYTES # BLD AUTO: 0.7 10*3/MM3 (ref 0.1–0.9)
MONOCYTES NFR BLD AUTO: 10.4 % (ref 5–12)
NEUTROPHILS # BLD AUTO: 3.86 10*3/MM3 (ref 1.7–7)
NEUTROPHILS NFR BLD AUTO: 57.3 % (ref 42.7–76)
PLATELET # BLD AUTO: 467 10*3/MM3 (ref 140–450)
PMV BLD AUTO: 9.1 FL (ref 6–12)
POTASSIUM BLD-SCNC: 4 MMOL/L (ref 3.5–5.2)
PROT SERPL-MCNC: 7.2 G/DL (ref 6–8.5)
RBC # BLD AUTO: 3.58 10*6/MM3 (ref 4.14–5.8)
SODIUM BLD-SCNC: 139 MMOL/L (ref 136–145)
TIBC SERPL-MCNC: 419 MCG/DL (ref 298–536)
TRANSFERRIN SERPL-MCNC: 281 MG/DL (ref 200–360)
WBC NRBC COR # BLD: 6.74 10*3/MM3 (ref 3.4–10.8)

## 2020-01-06 PROCEDURE — 83540 ASSAY OF IRON: CPT | Performed by: INTERNAL MEDICINE

## 2020-01-06 PROCEDURE — 84466 ASSAY OF TRANSFERRIN: CPT | Performed by: INTERNAL MEDICINE

## 2020-01-06 PROCEDURE — 36415 COLL VENOUS BLD VENIPUNCTURE: CPT | Performed by: INTERNAL MEDICINE

## 2020-01-06 PROCEDURE — 80053 COMPREHEN METABOLIC PANEL: CPT | Performed by: INTERNAL MEDICINE

## 2020-01-06 PROCEDURE — 82728 ASSAY OF FERRITIN: CPT | Performed by: INTERNAL MEDICINE

## 2020-01-06 PROCEDURE — 99214 OFFICE O/P EST MOD 30 MIN: CPT | Performed by: INTERNAL MEDICINE

## 2020-01-06 PROCEDURE — 85025 COMPLETE CBC W/AUTO DIFF WBC: CPT | Performed by: INTERNAL MEDICINE

## 2020-01-06 NOTE — PROGRESS NOTES
[facility]  HEMATOLOGY & ONCOLOGY    Cancer Staging Information:  Cancer Staging  No matching staging information was found for the patient.      Subjective     VISIT DIAGNOSIS:   No diagnosis found.    REASON FOR VISIT:     No chief complaint on file.       HEMATOLOGY / ONCOLOGY HISTORY:    No history exists.           INTERVAL HISTORY  Patient ID: Devonte Lara is a 59 y.o. year old male with ET here for f/u.  1/6/2020:  No issues. No rash, no mucositis. On 2 tabs daily. Tolerates very well.    He lost 10-15lbs intentionally. Denies n/v/, rash mouth sore, sob, cp, dizziness, LEO, night sweats.  -the rest of ros unremarkable.  -PE unremarkable.    Past Medical History:   Past Medical History:   Diagnosis Date   • Gout    • Hypertension      Past Surgical History:   Past Surgical History:   Procedure Laterality Date   • CYST REMOVAL      left neck     Social History:   Social History     Socioeconomic History   • Marital status:      Spouse name: Not on file   • Number of children: Not on file   • Years of education: Not on file   • Highest education level: Not on file   Tobacco Use   • Smoking status: Never Smoker   Substance and Sexual Activity   • Alcohol use: Yes   • Drug use: No   • Sexual activity: Yes     Partners: Female     Family History:   Family History   Problem Relation Age of Onset   • Diabetes Father    • Cancer Paternal Aunt    • Cancer Maternal Grandmother         Tumor Pituatary gland       Review of Systems   Constitutional: Negative.    HENT: Negative.    Eyes: Negative.    Respiratory: Negative.    Cardiovascular: Negative.    Gastrointestinal: Negative.    Endocrine: Negative.    Genitourinary: Negative.    Musculoskeletal: Negative.    Skin: Negative.    Neurological: Negative.    Hematological: Negative.    Psychiatric/Behavioral: Negative.         Performance Status:  Asymptomatic    Medications:    Current Outpatient Medications   Medication Sig Dispense Refill    • aspirin 81 MG chewable tablet Chew 81 mg Daily.     • hydroxyurea (HYDREA) 500 MG capsule TAKE ONE CAPSULE BY MOUTH THREE TIMES A DAY 90 capsule 5   • losartan (COZAAR) 50 MG tablet      • valsartan (DIOVAN) 160 MG tablet        No current facility-administered medications for this visit.        ALLERGIES:  No Known Allergies    Objective      There were no vitals filed for this visit.      Current Status 9/30/2019   ECOG score 0         Physical Examremains the same  General Appearance: Patient is awake, alert, oriented and in no acute distress. Patient is welldeveloped, wellnourished, and appears stated age.  HEENT: Normocephalic. Sclerae clear, conjunctiva pink, extraocular movements intact, pupils, round, reactive to light and  accommodation. Mouth and throat are clear with moist oral mucosa.  NECK: Supple, no jugular venous distention, thyroid not enlarged.  LYMPH: No cervical, supraclavicular, axillary, or inguinal lymphadenopathy.  CHEST: Equal bilateral expansion, AP  diameter normal, resonant percussion note  LUNGS: Good air movement, no rales, rhonchi, rubs or wheezes with auscultation  CARDIO: Regular sinus rhythm, no murmurs, gallops or rubs.  ABDOMEN: obese abdomen. Nondistended, soft, No tenderness, no guarding, no rebound, No hepatosplenomegaly. No abdominal masses. Bowel sounds positive. No hernia  GENITALIA: Not examined.  BREASTS: Not examined.  MUSKEL: No joint swelling, decreased motion, or inflammation  EXTREMS: No edema, clubbing, cyanosis, No varicose veins.  NEURO: Grossly nonfocal, Gait is coordinated and smooth, Cognition is preserved.  SKIN: No rashes, no ecchymoses, no petechia.  PSYCH: Oriented to time, place and person. Memory is preserved. Mood and affect appear normal  RECENT LABS:  No visits with results within 7 Day(s) from this visit.   Latest known visit with results is:   Office Visit on 09/30/2019   Component Date Value Ref Range Status   • Iron 09/30/2019 73  59 - 158  mcg/dL Final   • Iron Saturation 09/30/2019 20  20 - 50 % Final   • Transferrin 09/30/2019 244  200 - 360 mg/dL Final   • TIBC 09/30/2019 364  298 - 536 mcg/dL Final   • Ferritin 09/30/2019 471.70* 30.00 - 400.00 ng/mL Final       RADIOLOGY:  No results found.         Assessment/Plan  Devonte Lara is a 59 y.o. year old male with Essential Thrombocythemia on 1500mg qd HU here for f/u and doing well.    Patient Active Problem List   Diagnosis   • Thrombocythemia (CMS/HCC)   • Essential thrombocytosis (CMS/HCC)   • Vitamin B12 deficiency          1. Essential Thrombocythemia: . On HU 1000mg daily.   He has lost 10lbs, perhaps pharmocokinetics changed.  --9/30/19: labs reviewed with pt, wbc 2.3, hg 12.3, plt 100.  -labs reviewed with pt, blood counts low as outlined above. Advised to go down on his dosage to 1000mg(2tabs) the whole week.  1/6/2020: labs reviewed wbc 6.74, Hg 13.8, plt 467. Rashaun current dose. No change    -check iron profile, ferritin b12, folate and act accordingly  -lft elevated ALT 56, AST 61. Stable. 2/2 fatty liver He denies hepatotoxic meds.      2. HTN: on valsartan  3. B12 def: gets monthly B12 at work clinic  4. Fatty liver: CT abdomen 1/2017 showed fatty infiltration with borderline splenomegaly spleen measures 13.4cm.    rtc in 3 Months.      Antonio Fitzgerald MD    1/6/2020    3:04 PM

## 2020-04-15 ENCOUNTER — APPOINTMENT (OUTPATIENT)
Dept: LAB | Facility: HOSPITAL | Age: 60
End: 2020-04-15

## 2020-06-17 ENCOUNTER — TELEPHONE (OUTPATIENT)
Dept: ONCOLOGY | Facility: CLINIC | Age: 60
End: 2020-06-17

## 2020-06-17 NOTE — TELEPHONE ENCOUNTER
Left message for Henok to call and let us know where he moved so we can find a doctor close to where he lives.

## 2020-06-17 NOTE — TELEPHONE ENCOUNTER
PT WOULD LIKE TO ASK DR CANNON IF SHE CAN RECOMMEND A DOCTOR IN [place] FOR HIS CANCER CARE. HE HAS MOVED.     HE WOULD LIKE TO LET HER AND THE STAFF KNOW HOW MUCH HE APPRECIATED ALL THAT THEY DO.     YOU CAN CONTACT HIM BY PHONE OR EMAIL.    JAK LARA- [phone]  [email or web address]

## 2020-06-18 NOTE — TELEPHONE ENCOUNTER
Received return call from Devonte that he has moved to [facility].  Notified him that Dr. Fitzgerald did not have a recommendation for a physician in [place] and that if he finds one that we can send his records.  Verbalized understanding.

## 2020-06-23 DIAGNOSIS — D47.3 THROMBOCYTHEMIA, ESSENTIAL (HCC): Primary | ICD-10-CM

## 2020-07-02 ENCOUNTER — TELEPHONE (OUTPATIENT)
Dept: ONCOLOGY | Facility: CLINIC | Age: 60
End: 2020-07-02

## 2020-07-02 NOTE — TELEPHONE ENCOUNTER
Called and left message for patient if he wanted to get rescheduled with Dr. WANG. We've tried reaching out to him twice now, he had been on our wait list.